# Patient Record
Sex: MALE | Race: WHITE | NOT HISPANIC OR LATINO | Employment: OTHER | URBAN - METROPOLITAN AREA
[De-identification: names, ages, dates, MRNs, and addresses within clinical notes are randomized per-mention and may not be internally consistent; named-entity substitution may affect disease eponyms.]

---

## 2019-03-29 ENCOUNTER — CLINICAL SUPPORT (OUTPATIENT)
Dept: INTERNAL MEDICINE | Facility: CLINIC | Age: 69
End: 2019-03-29

## 2019-03-29 DIAGNOSIS — I48.0 PAROXYSMAL ATRIAL FIBRILLATION (HCC): Primary | ICD-10-CM

## 2019-03-29 LAB — INR PPP: 2.5 (ref 0.9–1.1)

## 2019-03-29 PROCEDURE — 93793 ANTICOAG MGMT PT WARFARIN: CPT | Performed by: INTERNAL MEDICINE

## 2019-03-29 PROCEDURE — 85610 PROTHROMBIN TIME: CPT | Performed by: INTERNAL MEDICINE

## 2019-03-29 PROCEDURE — 36416 COLLJ CAPILLARY BLOOD SPEC: CPT | Performed by: INTERNAL MEDICINE

## 2019-04-19 ENCOUNTER — ANTICOAGULATION VISIT (OUTPATIENT)
Dept: INTERNAL MEDICINE | Facility: CLINIC | Age: 69
End: 2019-04-19

## 2019-04-19 DIAGNOSIS — I48.20 CHRONIC ATRIAL FIBRILLATION (HCC): Primary | ICD-10-CM

## 2019-04-19 PROBLEM — I48.91 ATRIAL FIBRILLATION (HCC): Status: ACTIVE | Noted: 2019-04-19

## 2019-04-19 LAB
INR PPP: 3.5 (ref 0.9–1.1)
INR PPP: 3.5 (ref 0.9–1.1)

## 2019-04-19 PROCEDURE — 36416 COLLJ CAPILLARY BLOOD SPEC: CPT | Performed by: INTERNAL MEDICINE

## 2019-04-19 PROCEDURE — 85610 PROTHROMBIN TIME: CPT | Performed by: INTERNAL MEDICINE

## 2019-04-19 PROCEDURE — 93793 ANTICOAG MGMT PT WARFARIN: CPT | Performed by: INTERNAL MEDICINE

## 2019-04-26 ENCOUNTER — CLINICAL SUPPORT (OUTPATIENT)
Dept: INTERNAL MEDICINE | Facility: CLINIC | Age: 69
End: 2019-04-26

## 2019-04-26 ENCOUNTER — LAB (OUTPATIENT)
Dept: INTERNAL MEDICINE | Facility: CLINIC | Age: 69
End: 2019-04-26

## 2019-04-26 DIAGNOSIS — Z00.00 HEALTHCARE MAINTENANCE: ICD-10-CM

## 2019-04-26 DIAGNOSIS — I48.20 CHRONIC ATRIAL FIBRILLATION (HCC): ICD-10-CM

## 2019-04-26 DIAGNOSIS — E78.2 MIXED HYPERLIPIDEMIA: Primary | ICD-10-CM

## 2019-04-26 DIAGNOSIS — I10 ESSENTIAL HYPERTENSION, BENIGN: ICD-10-CM

## 2019-04-26 LAB — INR PPP: 2.8 (ref 0.9–1.1)

## 2019-04-26 PROCEDURE — 36416 COLLJ CAPILLARY BLOOD SPEC: CPT | Performed by: INTERNAL MEDICINE

## 2019-04-26 PROCEDURE — 93793 ANTICOAG MGMT PT WARFARIN: CPT | Performed by: INTERNAL MEDICINE

## 2019-04-26 PROCEDURE — 85610 PROTHROMBIN TIME: CPT | Performed by: INTERNAL MEDICINE

## 2019-04-27 LAB
ALBUMIN SERPL-MCNC: 4.1 G/DL (ref 3.5–5.2)
ALBUMIN/GLOB SERPL: 1.4 G/DL
ALP SERPL-CCNC: 78 U/L (ref 39–117)
ALT SERPL-CCNC: 14 U/L (ref 1–41)
AST SERPL-CCNC: 14 U/L (ref 1–40)
BILIRUB SERPL-MCNC: 0.3 MG/DL (ref 0.2–1.2)
BUN SERPL-MCNC: 15 MG/DL (ref 8–23)
BUN/CREAT SERPL: 18.3 (ref 7–25)
CALCIUM SERPL-MCNC: 10 MG/DL (ref 8.6–10.5)
CHLORIDE SERPL-SCNC: 104 MMOL/L (ref 98–107)
CHOLEST SERPL-MCNC: 109 MG/DL (ref 0–200)
CO2 SERPL-SCNC: 26.3 MMOL/L (ref 22–29)
CREAT SERPL-MCNC: 0.82 MG/DL (ref 0.76–1.27)
GLOBULIN SER CALC-MCNC: 3 GM/DL
GLUCOSE SERPL-MCNC: 111 MG/DL (ref 65–99)
HDLC SERPL-MCNC: 33 MG/DL (ref 40–60)
LDLC SERPL CALC-MCNC: 52 MG/DL (ref 0–100)
POTASSIUM SERPL-SCNC: 4.6 MMOL/L (ref 3.5–5.2)
PROT SERPL-MCNC: 7.1 G/DL (ref 6–8.5)
SODIUM SERPL-SCNC: 141 MMOL/L (ref 136–145)
TRIGL SERPL-MCNC: 118 MG/DL (ref 0–150)
VLDLC SERPL CALC-MCNC: 23.6 MG/DL

## 2019-04-30 RX ORDER — ROSUVASTATIN CALCIUM 10 MG/1
10 TABLET, COATED ORAL DAILY
COMMUNITY
Start: 2019-01-21 | End: 2019-04-30 | Stop reason: SDUPTHER

## 2019-04-30 RX ORDER — ROSUVASTATIN CALCIUM 10 MG/1
10 TABLET, COATED ORAL DAILY
Qty: 90 TABLET | Refills: 0 | Status: SHIPPED | OUTPATIENT
Start: 2019-04-30 | End: 2019-05-13 | Stop reason: SDUPTHER

## 2019-05-13 RX ORDER — ROSUVASTATIN CALCIUM 10 MG/1
10 TABLET, COATED ORAL DAILY
Qty: 90 TABLET | Refills: 1 | Status: SHIPPED | OUTPATIENT
Start: 2019-05-13 | End: 2019-08-08

## 2019-05-22 ENCOUNTER — OFFICE VISIT (OUTPATIENT)
Dept: INTERNAL MEDICINE | Facility: CLINIC | Age: 69
End: 2019-05-22

## 2019-05-22 ENCOUNTER — HOSPITAL ENCOUNTER (OUTPATIENT)
Dept: GENERAL RADIOLOGY | Facility: HOSPITAL | Age: 69
Discharge: HOME | End: 2019-05-22
Admitting: PHYSICIAN ASSISTANT

## 2019-05-22 ENCOUNTER — TELEPHONE (OUTPATIENT)
Dept: INTERNAL MEDICINE | Facility: CLINIC | Age: 69
End: 2019-05-22

## 2019-05-22 VITALS
TEMPERATURE: 97.9 F | HEART RATE: 64 BPM | HEIGHT: 68 IN | OXYGEN SATURATION: 96 % | WEIGHT: 276.2 LBS | RESPIRATION RATE: 20 BRPM | DIASTOLIC BLOOD PRESSURE: 80 MMHG | SYSTOLIC BLOOD PRESSURE: 124 MMHG | BODY MASS INDEX: 41.86 KG/M2

## 2019-05-22 DIAGNOSIS — I48.20 CHRONIC ATRIAL FIBRILLATION (HCC): ICD-10-CM

## 2019-05-22 DIAGNOSIS — R06.02 SOB (SHORTNESS OF BREATH): ICD-10-CM

## 2019-05-22 DIAGNOSIS — R05.9 COUGH: Primary | ICD-10-CM

## 2019-05-22 DIAGNOSIS — R06.2 WHEEZING: ICD-10-CM

## 2019-05-22 DIAGNOSIS — R06.2 WHEEZING: Primary | ICD-10-CM

## 2019-05-22 DIAGNOSIS — J44.9 CHRONIC OBSTRUCTIVE PULMONARY DISEASE, UNSPECIFIED COPD TYPE (HCC): ICD-10-CM

## 2019-05-22 PROBLEM — G47.33 OSA (OBSTRUCTIVE SLEEP APNEA): Status: ACTIVE | Noted: 2019-05-22

## 2019-05-22 PROBLEM — I25.10 CORONARY ARTERY DISEASE: Status: ACTIVE | Noted: 2019-05-22

## 2019-05-22 LAB — INR PPP: 1.7 (ref 0.9–1.1)

## 2019-05-22 PROCEDURE — 94640 AIRWAY INHALATION TREATMENT: CPT | Performed by: PHYSICIAN ASSISTANT

## 2019-05-22 PROCEDURE — 85610 PROTHROMBIN TIME: CPT | Performed by: PHYSICIAN ASSISTANT

## 2019-05-22 PROCEDURE — 71046 X-RAY EXAM CHEST 2 VIEWS: CPT

## 2019-05-22 PROCEDURE — 36416 COLLJ CAPILLARY BLOOD SPEC: CPT | Performed by: PHYSICIAN ASSISTANT

## 2019-05-22 PROCEDURE — 99213 OFFICE O/P EST LOW 20 MIN: CPT | Performed by: PHYSICIAN ASSISTANT

## 2019-05-22 RX ORDER — ALBUTEROL SULFATE 2.5 MG/3ML
2.5 SOLUTION RESPIRATORY (INHALATION) EVERY 4 HOURS PRN
Qty: 60 VIAL | Refills: 0 | Status: SHIPPED | OUTPATIENT
Start: 2019-05-22 | End: 2019-08-08

## 2019-05-22 RX ORDER — ALBUTEROL SULFATE 2.5 MG/3ML
2.5 SOLUTION RESPIRATORY (INHALATION) EVERY 4 HOURS PRN
Qty: 30 VIAL | Refills: 1 | Status: SHIPPED | OUTPATIENT
Start: 2019-05-22 | End: 2019-05-22 | Stop reason: SDUPTHER

## 2019-05-22 RX ORDER — FUROSEMIDE 40 MG/1
40 TABLET ORAL DAILY
COMMUNITY
End: 2019-08-07 | Stop reason: SDUPTHER

## 2019-05-22 RX ORDER — WARFARIN SODIUM 5 MG/1
5 TABLET ORAL DAILY
COMMUNITY
Start: 2017-05-15 | End: 2019-10-08

## 2019-05-22 RX ORDER — HYDROCODONE BITARTRATE AND ACETAMINOPHEN 5; 325 MG/1; MG/1
5 TABLET ORAL DAILY
COMMUNITY
Start: 2017-07-24 | End: 2019-08-08

## 2019-05-22 RX ORDER — FLURBIPROFEN SODIUM 0.3 MG/ML
0.3 SOLUTION/ DROPS OPHTHALMIC AS NEEDED
COMMUNITY
Start: 2019-05-02

## 2019-05-22 RX ORDER — IPRATROPIUM BROMIDE AND ALBUTEROL SULFATE 2.5; .5 MG/3ML; MG/3ML
3 SOLUTION RESPIRATORY (INHALATION)
Status: DISCONTINUED | OUTPATIENT
Start: 2019-05-22 | End: 2023-02-13

## 2019-05-22 RX ORDER — ALBUTEROL SULFATE 2.5 MG/3ML
2.5 SOLUTION RESPIRATORY (INHALATION) EVERY 4 HOURS PRN
Qty: 60 VIAL | Refills: 0 | Status: SHIPPED | OUTPATIENT
Start: 2019-05-22 | End: 2020-03-21 | Stop reason: SDUPTHER

## 2019-05-22 RX ORDER — ALLOPURINOL 300 MG/1
300 TABLET ORAL DAILY
COMMUNITY
Start: 2019-03-02 | End: 2020-11-17

## 2019-05-22 RX ORDER — COLCHICINE 0.6 MG/1
0.6 TABLET ORAL DAILY
COMMUNITY
End: 2020-11-17

## 2019-05-22 RX ORDER — ESOMEPRAZOLE MAGNESIUM 40 MG/1
40 CAPSULE, DELAYED RELEASE ORAL DAILY
COMMUNITY
End: 2020-11-17

## 2019-05-22 RX ORDER — DOCUSATE SODIUM 100 MG/1
100 CAPSULE, LIQUID FILLED ORAL AS NEEDED
COMMUNITY
End: 2020-11-17

## 2019-05-22 RX ORDER — NITROGLYCERIN 0.4 MG/1
0.4 TABLET SUBLINGUAL AS NEEDED
COMMUNITY
Start: 2017-08-03 | End: 2020-11-17

## 2019-05-22 RX ORDER — POTASSIUM CHLORIDE 750 MG/1
10 CAPSULE, EXTENDED RELEASE ORAL DAILY
COMMUNITY
Start: 2017-05-15 | End: 2019-08-08

## 2019-05-22 NOTE — TELEPHONE ENCOUNTER
Patient is calling because she gave him a nebulizer and the insurance would not pay. He is asking if we could reword the order so they would pay. He said they should have faxed over a request for this earlier. Pharmacy [phone]. Thanks

## 2019-05-22 NOTE — PROGRESS NOTES
Subjective   Chief Complaint   Patient presents with   • Shortness of Breath     cough x1 week soa    • URI   • Wheezing   • Cough       She is a 69-year-old white male who presented today for an INR check and asked to be seen due to cough and wheezing for the last week.  He states he is also had shortness of breath with his cough and is unable to get any relief.  No head congestion no postnasal drainage no fever no chills.  Cough is productive however sputum is thick and white.  He denies chest pain, he did take 4 500 mg tablets of amoxicillin yesterday trying to alleviate his symptoms that he had for dental procedures.            Patient Active Problem List   Diagnosis   • Atrial fibrillation (CMS/[facility]) [I48.91]       No Known Allergies    Current Outpatient Medications on File Prior to Visit   Medication Sig Dispense Refill   • allopurinol (ZYLOPRIM) 300 MG tablet Take 300 mg by mouth Daily.     • colchicine 0.6 MG tablet Take 0.6 mg by mouth Daily.     • docusate sodium (COLACE) 100 MG capsule Take 100 mg by mouth As Needed.     • esomeprazole (nexIUM) 40 MG capsule Take 40 mg by mouth Daily.     • flurbiprofen (OCUFEN) 0.03 % ophthalmic solution Administer 0.3 drops to both eyes As Needed.     • furosemide (LASIX) 40 MG tablet Take 40 mg by mouth Daily.     • HYDROcodone-acetaminophen (NORCO) 5-325 MG per tablet Take 5 tablets by mouth Daily.     • nitroglycerin (NITROSTAT) 0.4 MG SL tablet Take 0.4 mg by mouth As Needed.     • potassium chloride (MICRO-K) 10 MEQ CR capsule Take 10 mEq by mouth Daily.     • rosuvastatin (CRESTOR) 10 MG tablet Take 1 tablet by mouth Daily. 90 tablet 1   • warfarin (COUMADIN) 5 MG tablet Take 5 mg by mouth Daily.     • fluticasone (VERAMYST) 27.5 MCG/SPRAY nasal spray 2 sprays into the nostril(s) as directed by provider Daily.       No current facility-administered medications on file prior to visit.        Past Medical History:   Diagnosis Date   • Allergic    • Anemia    •  Cancer (CMS/HCC)     PROSTATE CA   • Chest pain    • Constipation    • Coronary artery disease    • Diastasis recti    • Flushing    • Gout    • Hyperlipidemia    • Hypertension    • Myocardial infarction (CMS/HCC)    • Obesity    • ERICK (obstructive sleep apnea)    • PFO (patent foramen ovale)    • Pneumonia    • TIA (transient ischemic attack)    • Tubular adenoma of colon    • Varicose vein of leg        Family History   Problem Relation Age of Onset   • Alcohol abuse Mother    • Arthritis Mother    • Colon cancer Mother    • Rectal cancer Mother    • Alcohol abuse Father    • Arthritis Father    • Lung cancer Father        Social History     Socioeconomic History   • Marital status:      Spouse name: Not on file   • Number of children: Not on file   • Years of education: Not on file   • Highest education level: Not on file   Tobacco Use   • Smoking status: Former Smoker     Packs/day: 1.00     Years: 30.     Pack years: 30.00     Types: Cigarettes     Last attempt to quit: 1997     Years since quittin.4   • Smokeless tobacco: Never Used   Substance and Sexual Activity   • Alcohol use: Yes     Comment: rarely   • Drug use: No   • Sexual activity: Defer       Past Surgical History:   Procedure Laterality Date   • CARDIAC CATHETERIZATION     • CATARACT EXTRACTION     • COLONOSCOPY  2018   • CORONARY ARTERY BYPASS GRAFT     • HEMORRHOIDECTOMY     • HERNIA REPAIR     • REPLACEMENT TOTAL KNEE     • ROTATOR CUFF REPAIR     • SHOULDER SURGERY         PHQ-9 Depression Screening  Little interest or pleasure in doing things? 0   Feeling down, depressed, or hopeless? 0   Trouble falling or staying asleep, or sleeping too much?     Feeling tired or having little energy?     Poor appetite or overeating?     Feeling bad about yourself - or that you are a failure or have let yourself or your family down?     Trouble concentrating on things, such as reading the newspaper or watching television?     Moving or  "speaking so slowly that other people could have noticed? Or the opposite - being so fidgety or restless that you have been moving around a lot more than usual?     Thoughts that you would be better off dead, or of hurting yourself in some way?     PHQ-9 Total Score 0   If you checked off any problems, how difficult have these problems made it for you to do your work, take care of things at home, or get along with other people?       The following portions of the patient's history were reviewed and updated as appropriate: problem list, allergies, current medications and past medical history.    Review of Systems   Constitution: Negative for chills and fever.   HENT: Negative for congestion.    Cardiovascular: Negative for chest pain.   Respiratory: Positive for cough, shortness of breath, sputum production and wheezing.        Immunization History   Administered Date(s) Administered   • Flu Vaccine High Dose PF 65YR+ 10/08/2018   • Pneumococcal Conjugate 13-Valent (PCV13) 10/03/2016   • Pneumococcal Polysaccharide (PPSV23) 10/28/2014   • Tdap 10/14/2015       Objective   Vitals:    05/22/19 0755 05/22/19 0827   BP:  124/80   Pulse: 64    Resp: 20    Temp: 97.9 °F (36.6 °C)    TempSrc: Oral    SpO2: 96%    Weight: 125 kg (276 lb 3.2 oz)    Height: 172.7 cm (68\")      Physical Exam   Cardiovascular: Normal rate and regular rhythm.   Pulmonary/Chest: Effort normal. He has wheezes in the right upper field, the right middle field, the right lower field, the left upper field, the left middle field and the left lower field.       Assessment/Plan     Albuterol neb today in office, pt tolerated well and had significant improvement in his wheezing, however still present on auscultation with expiration in all fields, worse in left base. Stat cxr showed no pneumonia. Will treat with Symbicort low dose 2 puffs twice per day and albuterol neb sent home with pt prn. He will follow up next week in office.     inr is 1.7 today " decreased his dose from 5 mg daily except Monday and fridays for which he was taking 7.5 mg on these days. Decreased to 5 mg daily after an INR of 3.5, his inr then stabilized at 2.8 now changed today. I will have him take 7.5 mg on Fridays and 5 mg every other day and then recheck in 2 weeks.     Return in about 6 days (around 5/28/2019).

## 2019-05-22 NOTE — PROGRESS NOTES
Subjective        Chief Complaint   Patient presents with   • Shortness of Breath     cough x1 week soa    • URI   • Wheezing   • Cough     PHQ-2 Depression Screening  Little interest or pleasure in doing things? 0   Feeling down, depressed, or hopeless? 0   PHQ-2 Total Score 0           Silvino Hidalgo Jr. is a 69 y.o. male who presents for    Patient Active Problem List   Diagnosis   • Atrial fibrillation (CMS/HCC) [I48.91]       History of Present Illness       No Known Allergies    Current Outpatient Medications on File Prior to Visit   Medication Sig Dispense Refill   • allopurinol (ZYLOPRIM) 300 MG tablet Take 300 mg by mouth Daily.     • colchicine 0.6 MG tablet Take 0.6 mg by mouth Daily.     • docusate sodium (COLACE) 100 MG capsule Take 100 mg by mouth As Needed.     • esomeprazole (nexIUM) 40 MG capsule Take 40 mg by mouth Daily.     • flurbiprofen (OCUFEN) 0.03 % ophthalmic solution Administer 0.3 drops to both eyes As Needed.     • furosemide (LASIX) 40 MG tablet Take 40 mg by mouth Daily.     • HYDROcodone-acetaminophen (NORCO) 5-325 MG per tablet Take 5 tablets by mouth Daily.     • nitroglycerin (NITROSTAT) 0.4 MG SL tablet Take 0.4 mg by mouth As Needed.     • potassium chloride (MICRO-K) 10 MEQ CR capsule Take 10 mEq by mouth Daily.     • rosuvastatin (CRESTOR) 10 MG tablet Take 1 tablet by mouth Daily. 90 tablet 1   • warfarin (COUMADIN) 5 MG tablet Take 5 mg by mouth Daily.     • fluticasone (VERAMYST) 27.5 MCG/SPRAY nasal spray 2 sprays into the nostril(s) as directed by provider Daily.       No current facility-administered medications on file prior to visit.        Past Medical History:   Diagnosis Date   • Allergic    • Anemia    • Cancer (CMS/HCC)     PROSTATE CA   • Chest pain    • Constipation    • Coronary artery disease    • Diastasis recti    • Flushing    • Gout    • Hyperlipidemia    • Hypertension    • Myocardial infarction (CMS/HCC)    • Obesity    • ERICK (obstructive sleep apnea)    •  "PFO (patent foramen ovale)    • Pneumonia    • TIA (transient ischemic attack)    • Tubular adenoma of colon    • Varicose vein of leg        Past Surgical History:   Procedure Laterality Date   • CARDIAC CATHETERIZATION     • CATARACT EXTRACTION     • COLONOSCOPY     • CORONARY ARTERY BYPASS GRAFT     • HEMORRHOIDECTOMY     • HERNIA REPAIR     • REPLACEMENT TOTAL KNEE     • ROTATOR CUFF REPAIR     • SHOULDER SURGERY         Family History   Problem Relation Age of Onset   • Alcohol abuse Mother    • Arthritis Mother    • Colon cancer Mother    • Rectal cancer Mother    • Alcohol abuse Father    • Arthritis Father    • Lung cancer Father        Social History     Socioeconomic History   • Marital status:      Spouse name: Not on file   • Number of children: Not on file   • Years of education: Not on file   • Highest education level: Not on file   Tobacco Use   • Smoking status: Former Smoker     Packs/day: 1.00     Years: 30.00     Pack years: 30.00     Types: Cigarettes     Last attempt to quit: 1997     Years since quittin.4   • Smokeless tobacco: Never Used   Substance and Sexual Activity   • Alcohol use: Yes     Comment: rarely   • Drug use: No   • Sexual activity: Defer           The following portions of the patient's history were reviewed and updated as appropriate: {history reviewed:19710::\"problem list\",\"allergies\",\"current medications\",\"past medical history\",\"past family history\",\"past social history\",\"past surgical history\"}.    Review of Systems    Immunization History   Administered Date(s) Administered   • Flu Vaccine High Dose PF 65YR+ 10/08/2018   • Pneumococcal Conjugate 13-Valent (PCV13) 10/03/2016   • Pneumococcal Polysaccharide (PPSV23) 10/28/2014   • Tdap 10/14/2015       Objective   Vitals:    19 0755 19 0827   BP:  124/80   Pulse: 64    Resp: 20    Temp: 97.9 °F (36.6 °C)    TempSrc: Oral    SpO2: 96%    Weight: 125 kg (276 lb 3.2 oz)    Height: 172.7 cm (68\")  "     Physical Exam    Procedures    Assessment/Plan   {Assess/PlanSmartLinks:64231}    inr is 1.7 today decreased his dose from 5 mg daily except Monday and fridays for which he was taking 7.5 mg on these days. Decreased to 5 mg daily after an INR of 3.5, his inr then stabilized at 2.8 now changed today. I will have him take 7.5 mg on Fridays and 5 mg every other day and then recheck in 2 weeks.          Return in about 6 days (around 5/28/2019).

## 2019-05-22 NOTE — NURSING NOTE
Report called to MD office for Payton RICHARDS and they wanted him to come back to the office for instructions on his nebulizer.  He left via ambulation to the office.

## 2019-05-22 NOTE — TELEPHONE ENCOUNTER
Pt in office informed of changes with Warfarin and fup PT/Inr 2 weeks also nebulizer solution called into [place] pt verbalizes understanding poncho

## 2019-05-28 ENCOUNTER — OFFICE VISIT (OUTPATIENT)
Dept: INTERNAL MEDICINE | Facility: CLINIC | Age: 69
End: 2019-05-28

## 2019-05-28 VITALS
HEIGHT: 68 IN | HEART RATE: 74 BPM | RESPIRATION RATE: 16 BRPM | WEIGHT: 274 LBS | OXYGEN SATURATION: 97 % | BODY MASS INDEX: 41.52 KG/M2

## 2019-05-28 DIAGNOSIS — J40 BRONCHITIS: Primary | ICD-10-CM

## 2019-05-28 DIAGNOSIS — I51.7 MILD CARDIOMEGALY: ICD-10-CM

## 2019-05-28 DIAGNOSIS — Z79.01 ANTICOAGULATED: ICD-10-CM

## 2019-05-28 PROCEDURE — 99213 OFFICE O/P EST LOW 20 MIN: CPT | Performed by: PHYSICIAN ASSISTANT

## 2019-05-28 NOTE — PROGRESS NOTES
Subjective   Chief Complaint   Patient presents with   • Cough     6 day follow up, pt states he is much better       Pt is a 69 year old white male who presents today for 6 day follow up on bronchitis. He had cxr a week ago that showed borderline cardiomegaly, has not been told this before. Sees. Dr. Coelho cardiology, unsure when his last echo was. Pt states he is markedly improved and is breathing better than he has in months. Wheezing has resolved, still has occasional cough but rare and dry. Overall much better.           Patient Active Problem List   Diagnosis   • Atrial fibrillation (CMS/[facility]) [I48.91]   • Coronary artery disease   • ERICK (obstructive sleep apnea)   • Mild cardiomegaly       No Known Allergies    Current Outpatient Medications on File Prior to Visit   Medication Sig Dispense Refill   • albuterol (PROVENTIL) (2.5 MG/3ML) 0.083% nebulizer solution Take 2.5 mg by nebulization Every 4 (Four) Hours As Needed for Wheezing. 60 vial 0   • albuterol (PROVENTIL) (2.5 MG/3ML) 0.083% nebulizer solution Take 2.5 mg by nebulization Every 4 (Four) Hours As Needed for Wheezing. 60 vial 0   • allopurinol (ZYLOPRIM) 300 MG tablet Take 300 mg by mouth Daily.     • colchicine 0.6 MG tablet Take 0.6 mg by mouth Daily.     • docusate sodium (COLACE) 100 MG capsule Take 100 mg by mouth As Needed.     • esomeprazole (nexIUM) 40 MG capsule Take 40 mg by mouth Daily.     • flurbiprofen (OCUFEN) 0.03 % ophthalmic solution Administer 0.3 drops to both eyes As Needed.     • fluticasone (VERAMYST) 27.5 MCG/SPRAY nasal spray 2 sprays into the nostril(s) as directed by provider Daily.     • furosemide (LASIX) 40 MG tablet Take 40 mg by mouth Daily.     • HYDROcodone-acetaminophen (NORCO) 5-325 MG per tablet Take 5 tablets by mouth Daily.     • nitroglycerin (NITROSTAT) 0.4 MG SL tablet Take 0.4 mg by mouth As Needed.     • potassium chloride (MICRO-K) 10 MEQ CR capsule Take 10 mEq by mouth Daily.     • rosuvastatin (CRESTOR) 10  MG tablet Take 1 tablet by mouth Daily. 90 tablet 1   • warfarin (COUMADIN) 5 MG tablet Take 5 mg by mouth Daily.       Current Facility-Administered Medications on File Prior to Visit   Medication Dose Route Frequency Provider Last Rate Last Dose   • ipratropium-albuterol (DUO-NEB) nebulizer solution 3 mL  3 mL Nebulization 4x Daily - RT Mary Avendaño PA-C       • ipratropium-albuterol (DUO-NEB) nebulizer solution 3 mL  3 mL Nebulization 4x Daily - RT Mary Avendaño PA-C           Past Medical History:   Diagnosis Date   • Allergic    • Anemia    • Cancer (CMS/HCC)     PROSTATE CA   • Chest pain    • Constipation    • Coronary artery disease    • Diastasis recti    • Flushing    • Gout    • Hyperlipidemia    • Hypertension    • Myocardial infarction (CMS/HCC)    • Obesity    • ERICK (obstructive sleep apnea)    • PFO (patent foramen ovale)    • Pneumonia    • TIA (transient ischemic attack)    • Tubular adenoma of colon    • Varicose vein of leg        Family History   Problem Relation Age of Onset   • Alcohol abuse Mother    • Arthritis Mother    • Colon cancer Mother    • Rectal cancer Mother    • Alcohol abuse Father    • Arthritis Father    • Lung cancer Father        Social History     Socioeconomic History   • Marital status:      Spouse name: Not on file   • Number of children: Not on file   • Years of education: Not on file   • Highest education level: Not on file   Tobacco Use   • Smoking status: Former Smoker     Packs/day: 1.00     Years: 30.00     Pack years: 30.00     Types: Cigarettes     Last attempt to quit: 1997     Years since quittin.4   • Smokeless tobacco: Never Used   Substance and Sexual Activity   • Alcohol use: Yes     Comment: rarely   • Drug use: No   • Sexual activity: Defer       Past Surgical History:   Procedure Laterality Date   • CARDIAC CATHETERIZATION     • CATARACT EXTRACTION     • COLONOSCOPY  2018   • CORONARY ARTERY BYPASS GRAFT     • HEMORRHOIDECTOMY     •  "HERNIA REPAIR     • REPLACEMENT TOTAL KNEE     • ROTATOR CUFF REPAIR     • SHOULDER SURGERY         PHQ-9 Depression Screening  Little interest or pleasure in doing things?     Feeling down, depressed, or hopeless?     Trouble falling or staying asleep, or sleeping too much?     Feeling tired or having little energy?     Poor appetite or overeating?     Feeling bad about yourself - or that you are a failure or have let yourself or your family down?     Trouble concentrating on things, such as reading the newspaper or watching television?     Moving or speaking so slowly that other people could have noticed? Or the opposite - being so fidgety or restless that you have been moving around a lot more than usual?     Thoughts that you would be better off dead, or of hurting yourself in some way?     PHQ-9 Total Score     If you checked off any problems, how difficult have these problems made it for you to do your work, take care of things at home, or get along with other people?       The following portions of the patient's history were reviewed and updated as appropriate: problem list, allergies, current medications, past medical history, past family history, past social history and past surgical history.    Review of Systems   Respiratory: Positive for cough. Negative for shortness of breath and wheezing.        Immunization History   Administered Date(s) Administered   • Flu Vaccine High Dose PF 65YR+ 10/08/2018   • Pneumococcal Conjugate 13-Valent (PCV13) 10/03/2016   • Pneumococcal Polysaccharide (PPSV23) 10/28/2014   • Tdap 10/14/2015       Objective   Vitals:    05/28/19 0933   Pulse: 74   Resp: 16   SpO2: 97%   Weight: 124 kg (274 lb)   Height: 172.7 cm (68\")     Physical Exam   Cardiovascular: Normal rate, regular rhythm and normal heart sounds.   Pulmonary/Chest: Effort normal and breath sounds normal.   Vitals reviewed.        Assessment/Plan   Silvino was seen today for cough.    Diagnoses and all orders for " this visit:    Bronchitis  Comments:  Resolved    Mild cardiomegaly  Comments:  Borderline per 5/22/19 CXR. Will obtain cardiac records from Dr. Coelho to determine if new.     Anticoagulated  Comments:  Adjusted coumadin dosing on 5/22/19, pt will return for INR on 6/5/19.

## 2019-06-05 ENCOUNTER — ANTICOAGULATION VISIT (OUTPATIENT)
Dept: INTERNAL MEDICINE | Facility: CLINIC | Age: 69
End: 2019-06-05

## 2019-06-05 DIAGNOSIS — I48.20 CHRONIC ATRIAL FIBRILLATION (HCC): ICD-10-CM

## 2019-06-05 LAB — INR PPP: 2.1 (ref 0.9–1.1)

## 2019-06-05 PROCEDURE — 85610 PROTHROMBIN TIME: CPT | Performed by: INTERNAL MEDICINE

## 2019-08-07 ENCOUNTER — TELEPHONE (OUTPATIENT)
Dept: INTERNAL MEDICINE | Facility: CLINIC | Age: 69
End: 2019-08-07

## 2019-08-07 RX ORDER — POTASSIUM CHLORIDE 750 MG/1
TABLET, FILM COATED, EXTENDED RELEASE ORAL
Qty: 180 TABLET | Refills: 2 | Status: SHIPPED | OUTPATIENT
Start: 2019-08-07 | End: 2020-11-17

## 2019-08-07 RX ORDER — FUROSEMIDE 40 MG/1
TABLET ORAL
Qty: 180 TABLET | Refills: 2 | Status: SHIPPED | OUTPATIENT
Start: 2019-08-07 | End: 2021-02-26

## 2019-08-08 ENCOUNTER — OFFICE VISIT (OUTPATIENT)
Dept: INTERNAL MEDICINE | Facility: CLINIC | Age: 69
End: 2019-08-08

## 2019-08-08 VITALS
HEIGHT: 68 IN | SYSTOLIC BLOOD PRESSURE: 128 MMHG | WEIGHT: 273.6 LBS | BODY MASS INDEX: 41.46 KG/M2 | DIASTOLIC BLOOD PRESSURE: 82 MMHG

## 2019-08-08 DIAGNOSIS — Z11.59 NEED FOR HEPATITIS C SCREENING TEST: ICD-10-CM

## 2019-08-08 DIAGNOSIS — E78.5 HYPERLIPIDEMIA, UNSPECIFIED HYPERLIPIDEMIA TYPE: ICD-10-CM

## 2019-08-08 DIAGNOSIS — R06.02 SHORTNESS OF BREATH: ICD-10-CM

## 2019-08-08 DIAGNOSIS — I25.10 CORONARY ARTERY DISEASE INVOLVING NATIVE CORONARY ARTERY OF NATIVE HEART WITHOUT ANGINA PECTORIS: Primary | ICD-10-CM

## 2019-08-08 DIAGNOSIS — C61 PROSTATE CANCER (HCC): ICD-10-CM

## 2019-08-08 DIAGNOSIS — G47.33 OSA (OBSTRUCTIVE SLEEP APNEA): ICD-10-CM

## 2019-08-08 DIAGNOSIS — I10 ESSENTIAL HYPERTENSION: ICD-10-CM

## 2019-08-08 PROBLEM — S62.101A RIGHT WRIST FRACTURE: Status: RESOLVED | Noted: 2017-05-24 | Resolved: 2019-08-08

## 2019-08-08 PROBLEM — S62.101A RIGHT WRIST FRACTURE: Status: ACTIVE | Noted: 2017-05-24

## 2019-08-08 PROBLEM — I25.5 ISCHEMIC CARDIOMYOPATHY: Status: ACTIVE | Noted: 2019-07-22

## 2019-08-08 PROBLEM — I45.3 TRIFASCICULAR BUNDLE BRANCH BLOCK: Status: ACTIVE | Noted: 2019-07-22

## 2019-08-08 PROBLEM — I50.30 CONGESTIVE HEART FAILURE WITH LV DIASTOLIC DYSFUNCTION, NYHA CLASS 2: Status: ACTIVE | Noted: 2019-07-22

## 2019-08-08 PROBLEM — I47.20 VT (VENTRICULAR TACHYCARDIA): Status: ACTIVE | Noted: 2019-07-22

## 2019-08-08 PROCEDURE — 99214 OFFICE O/P EST MOD 30 MIN: CPT | Performed by: INTERNAL MEDICINE

## 2019-08-08 RX ORDER — CARVEDILOL 25 MG/1
25 TABLET ORAL 2 TIMES DAILY
COMMUNITY
Start: 2019-07-23 | End: 2020-07-22

## 2019-08-08 RX ORDER — ASPIRIN 81 MG/1
81 TABLET, CHEWABLE ORAL DAILY
COMMUNITY
End: 2020-11-17

## 2019-08-08 RX ORDER — ATORVASTATIN CALCIUM 10 MG/1
10 TABLET, FILM COATED ORAL DAILY
COMMUNITY
Start: 2019-07-10 | End: 2020-11-25

## 2019-08-08 RX ORDER — ISOSORBIDE MONONITRATE 30 MG/1
30 TABLET, EXTENDED RELEASE ORAL DAILY
COMMUNITY
End: 2020-07-17

## 2019-08-08 NOTE — PROGRESS NOTES
Subjective        Chief Complaint   Patient presents with   • Follow-up     s/p AICD pacemaker   • Coronary Artery Disease   • Shortness of Breath       Fall Risk Assessment was completed, and patient is at low risk for falls.      Silvino Hidalgo Jr. is a 69 y.o. male who presents for    Patient Active Problem List   Diagnosis   • Atrial fibrillation (CMS/[facility]) [I48.91]   • ERICK (obstructive sleep apnea)   • Congestive heart failure with LV diastolic dysfunction, NYHA class 2 (CMS/[facility])   • Edema   • Fatigue   • H/O total shoulder replacement   • Hyperlipidemia   • Ischemic cardiomyopathy   • Obesity   • PFO (patent foramen ovale)   • RBBB (right bundle branch block)   • Rotator cuff arthropathy   • S/P CABG (coronary artery bypass graft)   • Shortness of breath   • Stroke (CMS/[facility])   • Trifascicular bundle branch block   • Venous (peripheral) insufficiency   • VT (ventricular tachycardia) (CMS/[facility])   • CAD (coronary artery disease)   • Hypertension   • Prostate cancer (CMS/[facility])       History of Present Illness     He had chest pain over a month ago. He went to see his cardiologist (Dr. Coelho) who referred to Dr. Cardoso. His EF was 35 percent; it dropped from 40 percent. He had a heart cath on July 9th and he had an AICD placed later in the month for VT and ischemic cardiomyopathy. His bypasses were open except one was 45 percent blocked. He has felt fine since except he is short of breath since having AICD adjusted last week.He was taken off Allopurinol with his cards. He was taken off Coumadin. He denies chest pain. He denies PND or orthopnea. He uses his biPap nightly. He gets up 3-4 times per night to urinate. It is not new. His prostate cancer was treated with radiation.  No Known Allergies    Current Outpatient Medications on File Prior to Visit   Medication Sig Dispense Refill   • albuterol (PROVENTIL) (2.5 MG/3ML) 0.083% nebulizer solution Take 2.5 mg by nebulization Every 4 (Four) Hours As Needed for Wheezing. 60  vial 0   • aspirin 81 MG chewable tablet Chew 81 mg Daily.     • atorvastatin (LIPITOR) 10 MG tablet Take 10 mg by mouth Daily.     • carvedilol (COREG) 25 MG tablet Take 25 mg by mouth 2 (Two) Times a Day.     • colchicine 0.6 MG tablet Take 0.6 mg by mouth Daily.     • docusate sodium (COLACE) 100 MG capsule Take 100 mg by mouth As Needed.     • esomeprazole (nexIUM) 40 MG capsule Take 40 mg by mouth Daily.     • flurbiprofen (OCUFEN) 0.03 % ophthalmic solution Administer 0.3 drops to both eyes As Needed.     • furosemide (LASIX) 40 MG tablet TAKE ONE TABLET BY MOUTH TWICE A  tablet 2   • isosorbide mononitrate (IMDUR) 30 MG 24 hr tablet Take 30 mg by mouth Daily.     • nitroglycerin (NITROSTAT) 0.4 MG SL tablet Take 0.4 mg by mouth As Needed.     • potassium chloride (K-DUR) 10 MEQ CR tablet TAKE ONE TABLET BY MOUTH TWICE A  tablet 2   • [DISCONTINUED] HYDROcodone-acetaminophen (NORCO) 5-325 MG per tablet Take 5 tablets by mouth Daily.     • allopurinol (ZYLOPRIM) 300 MG tablet Take 300 mg by mouth Daily.     • fluticasone (VERAMYST) 27.5 MCG/SPRAY nasal spray 2 sprays into the nostril(s) as directed by provider Daily.     • warfarin (COUMADIN) 5 MG tablet Take 5 mg by mouth Daily.     • [DISCONTINUED] albuterol (PROVENTIL) (2.5 MG/3ML) 0.083% nebulizer solution Take 2.5 mg by nebulization Every 4 (Four) Hours As Needed for Wheezing. 60 vial 0   • [DISCONTINUED] potassium chloride (MICRO-K) 10 MEQ CR capsule Take 10 mEq by mouth Daily.     • [DISCONTINUED] rosuvastatin (CRESTOR) 10 MG tablet Take 1 tablet by mouth Daily. 90 tablet 1     Current Facility-Administered Medications on File Prior to Visit   Medication Dose Route Frequency Provider Last Rate Last Dose   • ipratropium-albuterol (DUO-NEB) nebulizer solution 3 mL  3 mL Nebulization 4x Daily - RT Mary Avendaño P, PA-C       • ipratropium-albuterol (DUO-NEB) nebulizer solution 3 mL  3 mL Nebulization 4x Daily - RT Avendaño, Mary P, PA-C            Past Medical History:   Diagnosis Date   • Allergic    • Constipation    • Diastasis recti    • Flushing    • Gout    • Hyperlipidemia    • Hypertension    • Myocardial infarction (CMS/HCC)    • Obesity    • ERICK (obstructive sleep apnea)    • PFO (patent foramen ovale)    • Pneumonia    • Prostate cancer (CMS/HCC)     s/p XRT   • TIA (transient ischemic attack)    • Tubular adenoma of colon    • Varicose vein of leg        Past Surgical History:   Procedure Laterality Date   • CARDIAC CATHETERIZATION     • CARDIAC DEFIBRILLATOR PLACEMENT  2019   • CATARACT EXTRACTION     • COLONOSCOPY     • CORONARY ARTERY BYPASS GRAFT     • HEMORRHOIDECTOMY     • HERNIA REPAIR     • PACEMAKER IMPLANTATION     • REPLACEMENT TOTAL KNEE Bilateral    • ROTATOR CUFF REPAIR Right    • SHOULDER SURGERY Left     reversal       Family History   Problem Relation Age of Onset   • Alcohol abuse Mother    • Arthritis Mother    • Colon cancer Mother    • Rectal cancer Mother    • Alcohol abuse Father    • Arthritis Father    • Lung cancer Father        Social History     Socioeconomic History   • Marital status:      Spouse name: Not on file   • Number of children: Not on file   • Years of education: Not on file   • Highest education level: Not on file   Tobacco Use   • Smoking status: Former Smoker     Packs/day: 1.00     Years: 30.00     Pack years: 30.00     Types: Cigarettes     Last attempt to quit: 1997     Years since quittin.6   • Smokeless tobacco: Never Used   Substance and Sexual Activity   • Alcohol use: No     Frequency: Never     Comment: rarely   • Drug use: No   • Sexual activity: Defer           The following portions of the patient's history were reviewed and updated as appropriate: problem list, allergies, current medications, past medical history, past family history, past social history and past surgical history.    Review of Systems   Respiratory: Positive for shortness of breath.   "  Cardiovascular: Negative for chest pain.       Immunization History   Administered Date(s) Administered   • Flu Vaccine High Dose PF 65YR+ 10/08/2018   • Hepatitis A 10/01/2018, 05/01/2019   • Pneumococcal Conjugate 13-Valent (PCV13) 10/03/2016   • Pneumococcal Polysaccharide (PPSV23) 10/28/2014   • Tdap 10/14/2015       Objective   Vitals:    08/08/19 1407   BP: 128/82   Weight: 124 kg (273 lb 9.6 oz)   Height: 172.7 cm (68\")     Physical Exam   Constitutional: He appears well-developed and well-nourished.   HENT:   Head: Normocephalic and atraumatic.   Cardiovascular: Normal rate, regular rhythm, S1 normal, S2 normal and normal heart sounds.   Trace edema   Pulmonary/Chest: Effort normal and breath sounds normal.   Neurological: He is alert.   Skin: Skin is warm.   Psychiatric: He has a normal mood and affect.   Vitals reviewed.      Procedures    Assessment/Plan   Silvino was seen today for follow-up, coronary artery disease and shortness of breath.    Diagnoses and all orders for this visit:    Coronary artery disease involving native coronary artery of native heart without angina pectoris    Hyperlipidemia, unspecified hyperlipidemia type  -     Lipid Panel With / Chol / HDL Ratio; Future    Essential hypertension  -     Comprehensive Metabolic Panel; Future    ERICK (obstructive sleep apnea)    Shortness of breath    Prostate cancer (CMS/HCC)    Need for hepatitis C screening test  -     HCV Antibody Rfx To Qnt PCR; Future      Hospital records reviewed. His BP is good. He is SOA and he follows back up with his cardiologist next week. He uses his bipap. Check lipid next time on Lipitor. He was taken off of Coumadin with cards; get last note from Dr. Coelho. He will call his urologist as well for his nocturia.            Return in about 2 months (around 10/8/2019).  "

## 2019-08-19 ENCOUNTER — TELEPHONE (OUTPATIENT)
Dept: INTERNAL MEDICINE | Facility: CLINIC | Age: 69
End: 2019-08-19

## 2019-09-09 ENCOUNTER — TELEPHONE (OUTPATIENT)
Dept: INTERNAL MEDICINE | Facility: CLINIC | Age: 69
End: 2019-09-09

## 2019-09-09 NOTE — TELEPHONE ENCOUNTER
Brian with [facility] called they faxed over an order for supplies did we get it? [phone]

## 2019-10-02 LAB
ALBUMIN SERPL-MCNC: 3.7 G/DL (ref 3.5–5.2)
ALBUMIN/GLOB SERPL: 1.3 G/DL
ALP SERPL-CCNC: 88 U/L (ref 39–117)
ALT SERPL-CCNC: 10 U/L (ref 1–41)
AST SERPL-CCNC: 10 U/L (ref 1–40)
BILIRUB SERPL-MCNC: 0.4 MG/DL (ref 0.2–1.2)
BUN SERPL-MCNC: 10 MG/DL (ref 8–23)
BUN/CREAT SERPL: 11.2 (ref 7–25)
CALCIUM SERPL-MCNC: 8.8 MG/DL (ref 8.6–10.5)
CHLORIDE SERPL-SCNC: 106 MMOL/L (ref 98–107)
CHOLEST SERPL-MCNC: 113 MG/DL (ref 0–200)
CHOLEST/HDLC SERPL: 3.32 {RATIO}
CO2 SERPL-SCNC: 25.3 MMOL/L (ref 22–29)
CREAT SERPL-MCNC: 0.89 MG/DL (ref 0.76–1.27)
GLOBULIN SER CALC-MCNC: 2.9 GM/DL
GLUCOSE SERPL-MCNC: 98 MG/DL (ref 65–99)
HCV AB S/CO SERPL IA: <0.1 S/CO RATIO (ref 0–0.9)
HCV AB SERPL QL IA: NORMAL
HDLC SERPL-MCNC: 34 MG/DL (ref 40–60)
LDLC SERPL CALC-MCNC: 59 MG/DL (ref 0–100)
POTASSIUM SERPL-SCNC: 4.5 MMOL/L (ref 3.5–5.2)
PROT SERPL-MCNC: 6.6 G/DL (ref 6–8.5)
SODIUM SERPL-SCNC: 144 MMOL/L (ref 136–145)
TRIGL SERPL-MCNC: 98 MG/DL (ref 0–150)
VLDLC SERPL CALC-MCNC: 19.6 MG/DL

## 2019-10-03 ENCOUNTER — RESULTS ENCOUNTER (OUTPATIENT)
Dept: INTERNAL MEDICINE | Facility: CLINIC | Age: 69
End: 2019-10-03

## 2019-10-03 DIAGNOSIS — I10 ESSENTIAL HYPERTENSION: ICD-10-CM

## 2019-10-03 DIAGNOSIS — E78.5 HYPERLIPIDEMIA, UNSPECIFIED HYPERLIPIDEMIA TYPE: ICD-10-CM

## 2019-10-03 DIAGNOSIS — Z11.59 NEED FOR HEPATITIS C SCREENING TEST: ICD-10-CM

## 2019-10-08 ENCOUNTER — OFFICE VISIT (OUTPATIENT)
Dept: INTERNAL MEDICINE | Facility: CLINIC | Age: 69
End: 2019-10-08

## 2019-10-08 VITALS
WEIGHT: 277.4 LBS | BODY MASS INDEX: 42.04 KG/M2 | DIASTOLIC BLOOD PRESSURE: 82 MMHG | SYSTOLIC BLOOD PRESSURE: 126 MMHG | HEIGHT: 68 IN

## 2019-10-08 DIAGNOSIS — E66.01 MORBIDLY OBESE (HCC): ICD-10-CM

## 2019-10-08 DIAGNOSIS — E78.5 HYPERLIPIDEMIA, UNSPECIFIED HYPERLIPIDEMIA TYPE: ICD-10-CM

## 2019-10-08 DIAGNOSIS — I50.30 CONGESTIVE HEART FAILURE WITH LV DIASTOLIC DYSFUNCTION, NYHA CLASS 2 (HCC): ICD-10-CM

## 2019-10-08 DIAGNOSIS — I10 ESSENTIAL HYPERTENSION: Primary | ICD-10-CM

## 2019-10-08 PROCEDURE — G0008 ADMIN INFLUENZA VIRUS VAC: HCPCS | Performed by: INTERNAL MEDICINE

## 2019-10-08 PROCEDURE — 90653 IIV ADJUVANT VACCINE IM: CPT | Performed by: INTERNAL MEDICINE

## 2019-10-08 PROCEDURE — 99213 OFFICE O/P EST LOW 20 MIN: CPT | Performed by: INTERNAL MEDICINE

## 2019-10-08 NOTE — PROGRESS NOTES
Subjective        Chief Complaint   Patient presents with   • Follow-up     fup lab review med eval cad    • Hyperlipidemia   • Med Refill           Silvino Hidalgo Jr. is a 69 y.o. male who presents for    Patient Active Problem List   Diagnosis   • Atrial fibrillation (CMS/[facility]) [I48.91]   • ERICK (obstructive sleep apnea)   • Edema   • H/O total shoulder replacement   • Hyperlipidemia   • Ischemic cardiomyopathy   • Morbidly obese (CMS/[facility])   • PFO (patent foramen ovale)   • RBBB (right bundle branch block)   • Rotator cuff arthropathy   • S/P CABG (coronary artery bypass graft)   • Stroke (CMS/[facility])   • Trifascicular bundle branch block   • VT (ventricular tachycardia) (CMS/[facility])   • CAD (coronary artery disease)   • Hypertension   • Prostate cancer (CMS/[facility])       History of Present Illness     His SBP was 125 with the urologist. He denies edema, orthopnea, PND, chest pain or dyspnea. He says his EF is about 40 percent. He adds salt to his food sometimes.  No Known Allergies    Current Outpatient Medications on File Prior to Visit   Medication Sig Dispense Refill   • albuterol (PROVENTIL) (2.5 MG/3ML) 0.083% nebulizer solution Take 2.5 mg by nebulization Every 4 (Four) Hours As Needed for Wheezing. 60 vial 0   • allopurinol (ZYLOPRIM) 300 MG tablet Take 300 mg by mouth Daily.     • aspirin 81 MG chewable tablet Chew 81 mg Daily.     • atorvastatin (LIPITOR) 10 MG tablet Take 10 mg by mouth Daily.     • carvedilol (COREG) 25 MG tablet Take 25 mg by mouth 2 (Two) Times a Day.     • docusate sodium (COLACE) 100 MG capsule Take 100 mg by mouth As Needed.     • esomeprazole (nexIUM) 40 MG capsule Take 40 mg by mouth Daily.     • flurbiprofen (OCUFEN) 0.03 % ophthalmic solution Administer 0.3 drops to both eyes As Needed.     • fluticasone (VERAMYST) 27.5 MCG/SPRAY nasal spray 2 sprays into the nostril(s) as directed by provider Daily.     • furosemide (LASIX) 40 MG tablet TAKE ONE TABLET BY MOUTH TWICE A  tablet 2    • isosorbide mononitrate (IMDUR) 30 MG 24 hr tablet Take 30 mg by mouth Daily.     • nitroglycerin (NITROSTAT) 0.4 MG SL tablet Take 0.4 mg by mouth As Needed.     • potassium chloride (K-DUR) 10 MEQ CR tablet TAKE ONE TABLET BY MOUTH TWICE A  tablet 2   • colchicine 0.6 MG tablet Take 0.6 mg by mouth Daily.     • [DISCONTINUED] warfarin (COUMADIN) 5 MG tablet Take 5 mg by mouth Daily.       Current Facility-Administered Medications on File Prior to Visit   Medication Dose Route Frequency Provider Last Rate Last Dose   • ipratropium-albuterol (DUO-NEB) nebulizer solution 3 mL  3 mL Nebulization 4x Daily - RT Mary Avendaño PA-C       • ipratropium-albuterol (DUO-NEB) nebulizer solution 3 mL  3 mL Nebulization 4x Daily - RT Mary Avendaño PA-C           Past Medical History:   Diagnosis Date   • Allergic    • Constipation    • Diastasis recti    • Flushing    • Gout    • Hyperlipidemia    • Hypertension    • Myocardial infarction (CMS/HCC)    • Obesity    • ERICK (obstructive sleep apnea)    • PFO (patent foramen ovale)    • Pneumonia    • Prostate cancer (CMS/HCC)     s/p XRT   • TIA (transient ischemic attack)    • Tubular adenoma of colon        Past Surgical History:   Procedure Laterality Date   • CARDIAC CATHETERIZATION     • CARDIAC DEFIBRILLATOR PLACEMENT  07/22/2019   • CATARACT EXTRACTION     • COLONOSCOPY  2018   • CORONARY ARTERY BYPASS GRAFT     • HEMORRHOIDECTOMY     • HERNIA REPAIR     • PACEMAKER IMPLANTATION     • REPLACEMENT TOTAL KNEE Bilateral    • ROTATOR CUFF REPAIR Right    • SHOULDER SURGERY Left     reversal       Family History   Problem Relation Age of Onset   • Alcohol abuse Mother    • Arthritis Mother    • Colon cancer Mother    • Rectal cancer Mother    • Alcohol abuse Father    • Arthritis Father    • Lung cancer Father        Social History     Socioeconomic History   • Marital status:      Spouse name: Not on file   • Number of children: Not on file   • Years of  "education: Not on file   • Highest education level: Not on file   Tobacco Use   • Smoking status: Former Smoker     Packs/day: 1.00     Years: 30.00     Pack years: 30.00     Types: Cigarettes     Last attempt to quit: 1997     Years since quittin.7   • Smokeless tobacco: Never Used   Substance and Sexual Activity   • Alcohol use: No     Frequency: Never     Comment: rarely   • Drug use: No   • Sexual activity: Defer           The following portions of the patient's history were reviewed and updated as appropriate: problem list, allergies, current medications, past medical history, past family history, past social history and past surgical history.    Review of Systems   Respiratory: Negative for shortness of breath.    Cardiovascular: Negative for chest pain.       Immunization History   Administered Date(s) Administered   • FLUAD TRI 65YR+ 10/08/2019   • Flu Vaccine High Dose PF 65YR+ 10/08/2018   • Hepatitis A 10/01/2018, 2019   • Pneumococcal Conjugate 13-Valent (PCV13) 10/03/2016   • Pneumococcal Polysaccharide (PPSV23) 10/28/2014   • Tdap 10/14/2015       Objective   Vitals:    10/08/19 1353   BP: 126/82   Weight: 126 kg (277 lb 6.4 oz)   Height: 172.7 cm (68\")     Physical Exam   Constitutional: He appears well-developed and well-nourished.   HENT:   Head: Normocephalic and atraumatic.   Cardiovascular: Normal rate, regular rhythm, S1 normal, S2 normal and normal heart sounds.   Pulmonary/Chest: Effort normal and breath sounds normal.   Neurological: He is alert.   Skin: Skin is warm.   Psychiatric: He has a normal mood and affect.   Vitals reviewed.      Procedures    Assessment/Plan   Silvino was seen today for follow-up, hyperlipidemia and med refill.    Diagnoses and all orders for this visit:    Essential hypertension    Congestive heart failure with LV diastolic dysfunction, NYHA class 2 (CMS/HCC)    Morbidly obese (CMS/HCC)    Hyperlipidemia, unspecified hyperlipidemia type    Other " orders  -     Fluad Tri 63yr ([id number])             LDL is at goal. BP is good. Recc avoid salt and lose weight to help his heart.    Return in about 6 months (around 4/8/2020).

## 2019-10-10 ENCOUNTER — ANTICOAGULATION VISIT (OUTPATIENT)
Dept: INTERNAL MEDICINE | Facility: CLINIC | Age: 69
End: 2019-10-10

## 2019-10-10 DIAGNOSIS — I48.0 PAROXYSMAL ATRIAL FIBRILLATION (HCC): ICD-10-CM

## 2019-10-10 PROBLEM — I48.91 ATRIAL FIBRILLATION: Status: RESOLVED | Noted: 2019-04-19 | Resolved: 2019-10-10

## 2020-03-10 ENCOUNTER — OFFICE VISIT (OUTPATIENT)
Dept: INTERNAL MEDICINE | Facility: CLINIC | Age: 70
End: 2020-03-10

## 2020-03-10 ENCOUNTER — TELEPHONE (OUTPATIENT)
Dept: INTERNAL MEDICINE | Facility: CLINIC | Age: 70
End: 2020-03-10

## 2020-03-10 VITALS
DIASTOLIC BLOOD PRESSURE: 80 MMHG | BODY MASS INDEX: 41.01 KG/M2 | HEIGHT: 68 IN | SYSTOLIC BLOOD PRESSURE: 130 MMHG | WEIGHT: 270.6 LBS

## 2020-03-10 DIAGNOSIS — R51.9 ACUTE NONINTRACTABLE HEADACHE, UNSPECIFIED HEADACHE TYPE: Primary | ICD-10-CM

## 2020-03-10 DIAGNOSIS — R41.3 MEMORY CHANGES: ICD-10-CM

## 2020-03-10 DIAGNOSIS — R73.01 IMPAIRED FASTING GLUCOSE: ICD-10-CM

## 2020-03-10 PROCEDURE — 99213 OFFICE O/P EST LOW 20 MIN: CPT | Performed by: PHYSICIAN ASSISTANT

## 2020-03-10 RX ORDER — SPIRONOLACTONE 25 MG/1
25 TABLET ORAL DAILY
COMMUNITY
End: 2020-06-01

## 2020-03-10 NOTE — PROGRESS NOTES
Subjective   Chief Complaint   Patient presents with   • Eye Pain     right eye   • Headache   • Memory Loss       History of Present Illness     Has had a headache since Saturday right side of his head above his right eye. May have came on all of a sudden, but they are not sure. It went away one time and then came back.     He has taken something otc for it, but is unable to remember what it was exactly. May have taken tylenol. His wife is with him as well and she is not sure.     This morning was look at his phone and realized he did not know how to work his phone. Has been this way for a few days. He has not had any new vision changes, light or sound sensitivity. He has been having dizziness for several months, this is not new.     He keeps asking his wife questions today and has been repeating the same things.     He does have pain behind his right eye currently. No vision changes in the last 24 to 48 hours. Having some nausea as well that lasts about 30 seconds as well for the last few months. He has been evaluated by ent for vertigo.     No slurred speech recently over the last few days. No weakness in an extremity.     Feels like he has a weak tongue for several months. No recent change.     His only recent change from baseline is his right sided headache. He has no jaw pain. Has some mild tenderness around the scalp.     They are also both concerned with his memory over the last several months.     He states he also sometimes hears a train sound in his right ear, this is not new.      Patient Active Problem List   Diagnosis   • ERICK (obstructive sleep apnea)   • Edema   • H/O total shoulder replacement   • Hyperlipidemia   • Ischemic cardiomyopathy   • Morbidly obese (CMS/[facility])   • PFO (patent foramen ovale)   • RBBB (right bundle branch block)   • Rotator cuff arthropathy   • S/P CABG (coronary artery bypass graft)   • Stroke (CMS/[facility])   • Trifascicular bundle branch block   • VT (ventricular tachycardia)  (CMS/[facility])   • CAD (coronary artery disease)   • Hypertension   • Prostate cancer (CMS/[facility])       No Known Allergies    Current Outpatient Medications on File Prior to Visit   Medication Sig Dispense Refill   • allopurinol (ZYLOPRIM) 300 MG tablet Take 300 mg by mouth Daily.     • atorvastatin (LIPITOR) 10 MG tablet Take 10 mg by mouth Daily.     • carvedilol (COREG) 25 MG tablet Take 25 mg by mouth 2 (Two) Times a Day.     • colchicine 0.6 MG tablet Take 0.6 mg by mouth Daily.     • esomeprazole (nexIUM) 40 MG capsule Take 40 mg by mouth Daily.     • isosorbide mononitrate (IMDUR) 30 MG 24 hr tablet Take 30 mg by mouth Daily.     • nitroglycerin (NITROSTAT) 0.4 MG SL tablet Take 0.4 mg by mouth As Needed.     • spironolactone (ALDACTONE) 25 MG tablet Take 25 mg by mouth Daily.     • albuterol (PROVENTIL) (2.5 MG/3ML) 0.083% nebulizer solution Take 2.5 mg by nebulization Every 4 (Four) Hours As Needed for Wheezing. 60 vial 0   • aspirin 81 MG chewable tablet Chew 81 mg Daily.     • docusate sodium (COLACE) 100 MG capsule Take 100 mg by mouth As Needed.     • flurbiprofen (OCUFEN) 0.03 % ophthalmic solution Administer 0.3 drops to both eyes As Needed.     • fluticasone (VERAMYST) 27.5 MCG/SPRAY nasal spray 2 sprays into the nostril(s) as directed by provider Daily.     • furosemide (LASIX) 40 MG tablet TAKE ONE TABLET BY MOUTH TWICE A  tablet 2   • potassium chloride (K-DUR) 10 MEQ CR tablet TAKE ONE TABLET BY MOUTH TWICE A  tablet 2     Current Facility-Administered Medications on File Prior to Visit   Medication Dose Route Frequency Provider Last Rate Last Dose   • ipratropium-albuterol (DUO-NEB) nebulizer solution 3 mL  3 mL Nebulization 4x Daily - RT Mary Avendaño PA-C       • ipratropium-albuterol (DUO-NEB) nebulizer solution 3 mL  3 mL Nebulization 4x Daily - RT Mary Avendaño PA-C           Past Medical History:   Diagnosis Date   • Allergic    • Constipation    • Diastasis recti    • Flushing     • Gout    • Hyperlipidemia    • Hypertension    • Myocardial infarction (CMS/HCC)    • Obesity    • ERICK (obstructive sleep apnea)    • PFO (patent foramen ovale)    • Pneumonia    • Prostate cancer (CMS/HCC)     s/p XRT   • TIA (transient ischemic attack)    • Tubular adenoma of colon        Family History   Problem Relation Age of Onset   • Alcohol abuse Mother    • Arthritis Mother    • Colon cancer Mother    • Rectal cancer Mother    • Alcohol abuse Father    • Arthritis Father    • Lung cancer Father        Social History     Socioeconomic History   • Marital status:      Spouse name: Not on file   • Number of children: Not on file   • Years of education: Not on file   • Highest education level: Not on file   Tobacco Use   • Smoking status: Former Smoker     Packs/day: 1.00     Years: 30.00     Pack years: 30.00     Types: Cigarettes     Last attempt to quit: 1997     Years since quittin.2   • Smokeless tobacco: Never Used   Substance and Sexual Activity   • Alcohol use: No     Frequency: Never     Comment: rarely   • Drug use: No   • Sexual activity: Defer       Past Surgical History:   Procedure Laterality Date   • CARDIAC CATHETERIZATION     • CARDIAC DEFIBRILLATOR PLACEMENT  2019   • CATARACT EXTRACTION     • COLONOSCOPY     • CORONARY ARTERY BYPASS GRAFT     • HEMORRHOIDECTOMY     • HERNIA REPAIR     • PACEMAKER IMPLANTATION     • REPLACEMENT TOTAL KNEE Bilateral    • ROTATOR CUFF REPAIR Right    • SHOULDER SURGERY Left     reversal     The following portions of the patient's history were reviewed and updated as appropriate: problem list, allergies, current medications, past medical history, past family history, past social history and past surgical history.    Review of Systems   Constitution: Negative for malaise/fatigue.   Eyes: Negative for blurred vision, double vision, pain, vision loss in left eye, vision loss in right eye, visual disturbance and visual halos.  "  Cardiovascular: Negative for chest pain.   Neurological: Positive for difficulty with concentration, dizziness and headaches. Negative for disturbances in coordination, focal weakness, light-headedness, loss of balance, sensory change, tremors, vertigo and weakness.       Immunization History   Administered Date(s) Administered   • FLUAD TRI 65YR+ 10/08/2019   • Fluzone High Dose =>65 Years ([employer] ONLY) 10/08/2018   • Hepatitis A 10/01/2018, 05/01/2019   • Pneumococcal Conjugate 13-Valent (PCV13) 10/03/2016   • Pneumococcal Polysaccharide (PPSV23) 10/28/2014   • Tdap 10/14/2015       Objective   Vitals:    03/10/20 1430 03/10/20 1455   BP:  130/80   Weight: 123 kg (270 lb 9.6 oz)    Height: 172.7 cm (68\")      Body mass index is 41.14 kg/m².  Physical Exam   Constitutional: He is oriented to person, place, and time. He appears well-developed and well-nourished.   HENT:   Head: Normocephalic and atraumatic.   Mouth/Throat: Oropharynx is clear and moist.   No jaw claudication. ttp over right temporal artery   Eyes: Pupils are equal, round, and reactive to light. Conjunctivae and EOM are normal.   Cardiovascular: Normal rate, regular rhythm, S1 normal, S2 normal and normal heart sounds.   Pulmonary/Chest: Effort normal and breath sounds normal.   Neurological: He is alert and oriented to person, place, and time. He displays normal reflexes. No cranial nerve deficit. He exhibits normal muscle tone. Coordination normal.   Skin: Skin is warm and dry.   Psychiatric: He has a normal mood and affect. His behavior is normal. Judgment and thought content normal.   Vitals reviewed.    Assessment/Plan   Silvino was seen today for eye pain, headache and memory loss.    Diagnoses and all orders for this visit:    Acute nonintractable headache, unspecified headache type  -     C-reactive Protein  -     Sedimentation Rate  -     MRI Brain With & Without Contrast; Future    Memory changes  -     Hemoglobin A1c  -     TSH  -     " Vitamin B12  -     RPR    Impaired fasting glucose   -     Hemoglobin A1c      New headache onset 3 days ago. ESR and CRP today, will schedule MRI for tomorrow morning. He is not currently on ASA or anticoagulation anymore.     Memory changes. A1c, TSH, B12 and RPR today as well to evaluate.

## 2020-03-10 NOTE — TELEPHONE ENCOUNTER
Wife calling on pt having a lot of issues with pain over the right eye and headaches please advise and memory issues also

## 2020-03-11 ENCOUNTER — APPOINTMENT (OUTPATIENT)
Dept: MRI IMAGING | Facility: HOSPITAL | Age: 70
End: 2020-03-11

## 2020-03-11 DIAGNOSIS — M31.6 TEMPORAL ARTERITIS (HCC): Primary | ICD-10-CM

## 2020-03-11 LAB
CRP SERPL-MCNC: 4.2 MG/DL (ref 0–0.5)
ERYTHROCYTE [SEDIMENTATION RATE] IN BLOOD BY WESTERGREN METHOD: 62 MM/HR (ref 0–20)
HBA1C MFR BLD: 5.7 % (ref 4.8–5.6)
RPR SER QL: NORMAL
TSH SERPL DL<=0.005 MIU/L-ACNC: 2.46 UIU/ML (ref 0.27–4.2)
VIT B12 SERPL-MCNC: 471 PG/ML (ref 211–946)

## 2020-03-11 RX ORDER — PREDNISONE 20 MG/1
TABLET ORAL
Qty: 42 TABLET | Refills: 0 | Status: SHIPPED | OUTPATIENT
Start: 2020-03-11 | End: 2020-03-23

## 2020-03-21 DIAGNOSIS — R06.02 SOB (SHORTNESS OF BREATH): ICD-10-CM

## 2020-03-21 DIAGNOSIS — R06.2 WHEEZING: ICD-10-CM

## 2020-03-21 RX ORDER — ALBUTEROL SULFATE 2.5 MG/3ML
2.5 SOLUTION RESPIRATORY (INHALATION) EVERY 4 HOURS PRN
Qty: 30 VIAL | Refills: 1 | Status: SHIPPED | OUTPATIENT
Start: 2020-03-21 | End: 2020-03-23 | Stop reason: SDUPTHER

## 2020-03-23 ENCOUNTER — HOSPITAL ENCOUNTER (OUTPATIENT)
Dept: CT IMAGING | Facility: HOSPITAL | Age: 70
Discharge: HOME | End: 2020-03-23
Admitting: PHYSICIAN ASSISTANT

## 2020-03-23 ENCOUNTER — TELEPHONE (OUTPATIENT)
Dept: INTERNAL MEDICINE | Facility: CLINIC | Age: 70
End: 2020-03-23

## 2020-03-23 ENCOUNTER — OFFICE VISIT (OUTPATIENT)
Dept: INTERNAL MEDICINE | Facility: CLINIC | Age: 70
End: 2020-03-23

## 2020-03-23 VITALS
BODY MASS INDEX: 40.38 KG/M2 | HEIGHT: 68 IN | SYSTOLIC BLOOD PRESSURE: 124 MMHG | WEIGHT: 266.4 LBS | DIASTOLIC BLOOD PRESSURE: 80 MMHG

## 2020-03-23 DIAGNOSIS — J40 BRONCHITIS: Primary | ICD-10-CM

## 2020-03-23 DIAGNOSIS — R51.9 NONINTRACTABLE HEADACHE, UNSPECIFIED CHRONICITY PATTERN, UNSPECIFIED HEADACHE TYPE: ICD-10-CM

## 2020-03-23 DIAGNOSIS — H53.9 VISION CHANGES: ICD-10-CM

## 2020-03-23 DIAGNOSIS — R06.02 SOB (SHORTNESS OF BREATH): ICD-10-CM

## 2020-03-23 DIAGNOSIS — M25.519 SHOULDER PAIN, UNSPECIFIED CHRONICITY, UNSPECIFIED LATERALITY: Primary | ICD-10-CM

## 2020-03-23 DIAGNOSIS — R06.2 WHEEZING: ICD-10-CM

## 2020-03-23 PROCEDURE — 99214 OFFICE O/P EST MOD 30 MIN: CPT | Performed by: PHYSICIAN ASSISTANT

## 2020-03-23 PROCEDURE — 25010000002 IOPAMIDOL 61 % SOLUTION: Performed by: PHYSICIAN ASSISTANT

## 2020-03-23 PROCEDURE — 70496 CT ANGIOGRAPHY HEAD: CPT

## 2020-03-23 PROCEDURE — 82565 ASSAY OF CREATININE: CPT

## 2020-03-23 PROCEDURE — 70498 CT ANGIOGRAPHY NECK: CPT

## 2020-03-23 RX ORDER — ALBUTEROL SULFATE 2.5 MG/3ML
2.5 SOLUTION RESPIRATORY (INHALATION) EVERY 4 HOURS PRN
Qty: 30 VIAL | Refills: 1 | Status: SHIPPED | OUTPATIENT
Start: 2020-03-23

## 2020-03-23 RX ADMIN — IOPAMIDOL 100 ML: 612 INJECTION, SOLUTION INTRAVENOUS at 14:54

## 2020-03-23 NOTE — TELEPHONE ENCOUNTER
I spoke with Dr. Bridges, on call for Dr. Franz, patient's ENT on Saturday 3/21/20 regarding pathology results from his temporal artery biopsy on 3/11/20. His pathology was negative for acute inflammation etc. I advised patient and his wife to stop the prednisone at that time. He had been on 60 mg daily for 10 days at the time. He had mentioned in conversation on Saturday that the prednisone seemed to really help his shoulder pain.     I asked the patient to come in this afternoon to discuss symptoms further including possible PMR etc.     Wife called back after appointment was made reporting that the patient was having changes in his vision and difficulty seeing. She was advised to take Mr. Hidalgo to the ER immediately.

## 2020-03-23 NOTE — PROGRESS NOTES
Subjective   Chief Complaint   Patient presents with   • Vision Disturbance     right eye     History of Present Illness   Pt here today for fup. He had right sided headache for several days and was seen by myself on 3/10. He had an elevated sed rate of 0.62 and an elevated CRP of 4.2. Along with presenting right sided temporal pain GCA was suspected. He was started on Prednisone 60 mg daily and referred to his ENT Dr. Franz who performed a temporal artery biopsy on 3/11/20.    His pathology was resulted over the weekend and was negative for acute inflammation. I spoke with the patient and subsequent d/c the prednisone. He has been off this for 2 days now.     Pt states that he had been having shoulder pain for several months that was improved with the prednisone. He notes he has had shoulder pain for years, but worse in the last few months. He had been going to PT as well. It is of note that he has had bilateral shoulder surgery for various previous problems.     He has had no other joint or hip pain. His right sided headache did resolve. Still has a mild headache on the top of his head.     After talking to the patient this morning and asking him to come back in to the office to be evaluated for possible PMR etc. He developed right peripheral vision loss that lasted for about 45 minutes. No curtain shade phenomenon. No sx on the left, only right eye. No pain present. No double vision or blurred vision. No slurred speech or weakness in any extremity. He has a hx of floaters, not worse by this.     After 45 minutes and 4 ASA 81 mg later, vision returned to nml.     It is of note he has a history of stroke.     He does have an opthalmologyist. He sees Dr. Simone Morin.      Patient Active Problem List   Diagnosis   • ERICK (obstructive sleep apnea)   • Edema   • H/O total shoulder replacement   • Hyperlipidemia   • Ischemic cardiomyopathy   • Morbidly obese (CMS/HCC)   • PFO (patent foramen ovale)   • RBBB (right bundle  branch block)   • Rotator cuff arthropathy   • S/P CABG (coronary artery bypass graft)   • Stroke (CMS/[facility])   • Trifascicular bundle branch block   • VT (ventricular tachycardia) (CMS/[facility])   • CAD (coronary artery disease)   • Hypertension   • Prostate cancer (CMS/[facility])     No Known Allergies    Current Outpatient Medications on File Prior to Visit   Medication Sig Dispense Refill   • allopurinol (ZYLOPRIM) 300 MG tablet Take 300 mg by mouth Daily.     • aspirin 81 MG chewable tablet Chew 81 mg Daily.     • atorvastatin (LIPITOR) 10 MG tablet Take 10 mg by mouth Daily.     • carvedilol (COREG) 25 MG tablet Take 25 mg by mouth 2 (Two) Times a Day.     • colchicine 0.6 MG tablet Take 0.6 mg by mouth Daily.     • docusate sodium (COLACE) 100 MG capsule Take 100 mg by mouth As Needed.     • esomeprazole (nexIUM) 40 MG capsule Take 40 mg by mouth Daily.     • flurbiprofen (OCUFEN) 0.03 % ophthalmic solution Administer 0.3 drops to both eyes As Needed.     • fluticasone (VERAMYST) 27.5 MCG/SPRAY nasal spray 2 sprays into the nostril(s) as directed by provider Daily.     • furosemide (LASIX) 40 MG tablet TAKE ONE TABLET BY MOUTH TWICE A  tablet 2   • isosorbide mononitrate (IMDUR) 30 MG 24 hr tablet Take 30 mg by mouth Daily.     • nitroglycerin (NITROSTAT) 0.4 MG SL tablet Take 0.4 mg by mouth As Needed.     • potassium chloride (K-DUR) 10 MEQ CR tablet TAKE ONE TABLET BY MOUTH TWICE A  tablet 2   • spironolactone (ALDACTONE) 25 MG tablet Take 25 mg by mouth Daily.     • [DISCONTINUED] albuterol (PROVENTIL) (2.5 MG/3ML) 0.083% nebulizer solution Take 2.5 mg by nebulization Every 4 (Four) Hours As Needed for Wheezing. 30 vial 1   • [DISCONTINUED] predniSONE (DELTASONE) 20 MG tablet Take 3 tablets daily x 2 weeks 42 tablet 0     Current Facility-Administered Medications on File Prior to Visit   Medication Dose Route Frequency Provider Last Rate Last Dose   • ipratropium-albuterol (DUO-NEB) nebulizer solution 3  mL  3 mL Nebulization 4x Daily - RT AvendañoMary PA-C       • ipratropium-albuterol (DUO-NEB) nebulizer solution 3 mL  3 mL Nebulization 4x Daily - RT AvendañoMary PA-C           Past Medical History:   Diagnosis Date   • Allergic    • Constipation    • Diastasis recti    • Flushing    • Gout    • Hyperlipidemia    • Hypertension    • Myocardial infarction (CMS/HCC)    • Obesity    • ERICK (obstructive sleep apnea)    • PFO (patent foramen ovale)    • Pneumonia    • Prostate cancer (CMS/HCC)     s/p XRT   • TIA (transient ischemic attack)    • Tubular adenoma of colon        Family History   Problem Relation Age of Onset   • Alcohol abuse Mother    • Arthritis Mother    • Colon cancer Mother    • Rectal cancer Mother    • Alcohol abuse Father    • Arthritis Father    • Lung cancer Father        Social History     Socioeconomic History   • Marital status:      Spouse name: Not on file   • Number of children: Not on file   • Years of education: Not on file   • Highest education level: Not on file   Tobacco Use   • Smoking status: Former Smoker     Packs/day: 1.00     Years: 30.00     Pack years: 30.00     Types: Cigarettes     Last attempt to quit: 1997     Years since quittin.2   • Smokeless tobacco: Never Used   Substance and Sexual Activity   • Alcohol use: No     Frequency: Never     Comment: rarely   • Drug use: No   • Sexual activity: Defer       Past Surgical History:   Procedure Laterality Date   • CARDIAC CATHETERIZATION     • CARDIAC DEFIBRILLATOR PLACEMENT  2019   • CATARACT EXTRACTION     • COLONOSCOPY     • CORONARY ARTERY BYPASS GRAFT     • HEMORRHOIDECTOMY     • HERNIA REPAIR     • PACEMAKER IMPLANTATION     • REPLACEMENT TOTAL KNEE Bilateral    • ROTATOR CUFF REPAIR Right    • SHOULDER SURGERY Left     reversal     The following portions of the patient's history were reviewed and updated as appropriate: problem list, allergies, current medications, past medical history, past  "family history, past social history and past surgical history.    Review of Systems   Constitution: Negative for chills and fever.   Eyes: Negative for double vision.   Cardiovascular: Negative for chest pain and dyspnea on exertion.   Musculoskeletal:        Shoulder pain   Neurological: Positive for headaches. Negative for disturbances in coordination, dizziness, focal weakness, light-headedness, vertigo and weakness.     Immunization History   Administered Date(s) Administered   • FLUAD TRI 65YR+ 10/08/2019   • Fluzone High Dose =>65 Years ([employer] ONLY) 10/08/2018   • Hepatitis A 10/01/2018, 05/01/2019   • Pneumococcal Conjugate 13-Valent (PCV13) 10/03/2016   • Pneumococcal Polysaccharide (PPSV23) 10/28/2014   • Tdap 10/14/2015     Objective   Vitals:    03/23/20 1258 03/23/20 1316   BP:  124/80   Weight: 121 kg (266 lb 6.4 oz)    Height: 172.7 cm (68\")      Body mass index is 40.51 kg/m².  Physical Exam   Constitutional: He is oriented to person, place, and time. He appears well-developed and well-nourished.   HENT:   Head: Normocephalic and atraumatic.   Eyes: Pupils are equal, round, and reactive to light. Conjunctivae and EOM are normal.   Peripheral vision intact   Neck: Carotid bruit is not present.   Cardiovascular: Normal rate and regular rhythm.   Pulmonary/Chest: Effort normal and breath sounds normal.   Neurological: He is alert and oriented to person, place, and time. No cranial nerve deficit.   Vitals reviewed.        Assessment/Plan   Silvino was seen today for vision disturbance.    Diagnoses and all orders for this visit:    Shoulder pain, unspecified chronicity, unspecified laterality  -     C-reactive Protein  -     Sedimentation Rate  -     ERIKA by IFA, Reflex 9-biomarkers profile  -     Rheumatoid Factor  -     Cyclic Citrul Peptide Antibody, IgG / IgA    Nonintractable headache, unspecified chronicity pattern, unspecified headache type  -     CT Angiogram Head With Contrast  -     CT Angiogram " Neck With & Without Contrast    Vision changes  -     CT Angiogram Head With Contrast  -     CT Angiogram Neck With & Without Contrast      As CRP and sed rate were elevated will recheck today. I am also getting imaging stat due to new vision symptoms. He has a non MRI compatible ACID, will proceed with CTA head and neck today. Also check pt for any underlying rheumatologic conditions that could be contributing.

## 2020-03-23 NOTE — TELEPHONE ENCOUNTER
FYI-Patient's wife called upset stating her  is losing his vision now.I have instructed her to take him to the emergency room or call an ambulance. She is taking him now.

## 2020-03-24 DIAGNOSIS — J32.9 SINUSITIS, UNSPECIFIED CHRONICITY, UNSPECIFIED LOCATION: Primary | ICD-10-CM

## 2020-03-24 LAB — CREAT BLDA-MCNC: 0.9 MG/DL (ref 0.6–1.3)

## 2020-03-24 RX ORDER — AMOXICILLIN AND CLAVULANATE POTASSIUM 875; 125 MG/1; MG/1
1 TABLET, FILM COATED ORAL 2 TIMES DAILY
Qty: 28 TABLET | Refills: 0 | Status: SHIPPED | OUTPATIENT
Start: 2020-03-24 | End: 2020-04-07

## 2020-03-25 LAB
ANA HOMOGEN TITR SER: NORMAL {TITER}
ANA TITR SER IF: POSITIVE {TITER}
CCP IGA+IGG SERPL IA-ACNC: 12 UNITS (ref 0–19)
CENTROMERE B AB SER-ACNC: <0.2 AI (ref 0–0.9)
CHROMATIN AB SERPL-ACNC: <0.2 AI (ref 0–0.9)
CRP SERPL-MCNC: 0.63 MG/DL (ref 0–0.5)
DSDNA AB SER-ACNC: <1 IU/ML (ref 0–9)
ENA JO1 AB SER-ACNC: <0.2 AI (ref 0–0.9)
ENA RNP AB SER-ACNC: 0.3 AI (ref 0–0.9)
ENA SCL70 AB SER-ACNC: <0.2 AI (ref 0–0.9)
ENA SM AB SER-ACNC: <0.2 AI (ref 0–0.9)
ENA SS-A AB SER-ACNC: <0.2 AI (ref 0–0.9)
ENA SS-B AB SER-ACNC: <0.2 AI (ref 0–0.9)
ERYTHROCYTE [SEDIMENTATION RATE] IN BLOOD BY WESTERGREN METHOD: 8 MM/HR (ref 0–20)
LABORATORY COMMENT REPORT: ABNORMAL
Lab: NORMAL
Lab: NORMAL
RHEUMATOID FACT SERPL-ACNC: 22.6 IU/ML (ref 0–13.9)

## 2020-03-31 ENCOUNTER — TELEPHONE (OUTPATIENT)
Dept: INTERNAL MEDICINE | Facility: CLINIC | Age: 70
End: 2020-03-31

## 2020-03-31 NOTE — TELEPHONE ENCOUNTER
Called and spoke with patient, informed him we received a letter from \[facility]\"" Rheumatology stating he declined a virtual visit.  I encouraged him to set up [facility] and proceed with the virtual visit.  I resend a code to his email to activate [employer].  Patient stated he would try to do this.  He is aware Dr. Rutherford highly recommends this visit.

## 2020-04-02 ENCOUNTER — TELEPHONE (OUTPATIENT)
Dept: INTERNAL MEDICINE | Facility: CLINIC | Age: 70
End: 2020-04-02

## 2020-04-16 ENCOUNTER — TELEPHONE (OUTPATIENT)
Dept: INTERNAL MEDICINE | Facility: CLINIC | Age: 70
End: 2020-04-16

## 2020-04-16 NOTE — TELEPHONE ENCOUNTER
PTS WIFE CALLED STATING ANOTHER DOCTOR STATES SHE HAS GIANT CELL ARTERITIS. PT IS GOING TO HAVE TO TAKE PREDNISONE AND WIFE DOES NOT WANT PT TO TAKE MEDICATION IF HE DOESN'T NEED TO.     PLEASE ADVISE     CALL BACK   [phone]

## 2020-04-16 NOTE — TELEPHONE ENCOUNTER
I spoke with Dr. Price two weeks ago. He feels that Mr. Hidalgo has giant cell arteritis and should take Prednisone to prevent blindness with it. If there is any concern, they need to call his office. I am sure he could do another telemedicine visit with him to help clarify more.

## 2020-06-01 ENCOUNTER — OFFICE VISIT (OUTPATIENT)
Dept: INTERNAL MEDICINE | Facility: CLINIC | Age: 70
End: 2020-06-01

## 2020-06-01 VITALS
WEIGHT: 265 LBS | BODY MASS INDEX: 40.16 KG/M2 | DIASTOLIC BLOOD PRESSURE: 80 MMHG | HEIGHT: 68 IN | SYSTOLIC BLOOD PRESSURE: 144 MMHG

## 2020-06-01 DIAGNOSIS — E66.01 MORBIDLY OBESE (HCC): ICD-10-CM

## 2020-06-01 DIAGNOSIS — G47.33 OSA (OBSTRUCTIVE SLEEP APNEA): ICD-10-CM

## 2020-06-01 DIAGNOSIS — M31.6 TEMPORAL ARTERITIS (HCC): ICD-10-CM

## 2020-06-01 DIAGNOSIS — I10 ESSENTIAL HYPERTENSION: Primary | ICD-10-CM

## 2020-06-01 PROCEDURE — 99214 OFFICE O/P EST MOD 30 MIN: CPT | Performed by: INTERNAL MEDICINE

## 2020-06-01 RX ORDER — LISINOPRIL 10 MG/1
10 TABLET ORAL DAILY
Qty: 30 TABLET | Refills: 3 | Status: SHIPPED | OUTPATIENT
Start: 2020-06-01 | End: 2020-07-17

## 2020-06-01 RX ORDER — PREDNISONE 20 MG/1
TABLET ORAL
COMMUNITY
Start: 2020-03-11 | End: 2020-06-01

## 2020-06-01 RX ORDER — PREDNISONE 10 MG/1
TABLET ORAL
COMMUNITY
Start: 2020-05-15 | End: 2020-11-17

## 2020-06-01 NOTE — PROGRESS NOTES
Subjective        Chief Complaint   Patient presents with   • Hyperlipidemia   • Hypertension           Silvino Hidalgo Jr. is a 70 y.o. male who presents for    Patient Active Problem List   Diagnosis   • ERICK (obstructive sleep apnea)   • Edema   • H/O total shoulder replacement   • Hyperlipidemia   • Ischemic cardiomyopathy   • Morbidly obese (CMS/[facility])   • PFO (patent foramen ovale)   • RBBB (right bundle branch block)   • Rotator cuff arthropathy   • S/P CABG (coronary artery bypass graft)   • Stroke (CMS/[facility])   • Trifascicular bundle branch block   • VT (ventricular tachycardia) (CMS/[facility])   • CAD (coronary artery disease)   • Hypertension   • Prostate cancer (CMS/[facility])   • Temporal arteritis (CMS/[facility])       History of Present Illness     He is on 25 mg of Prednisone for GCA; Dr. Price is weaning him down.He is not having blind spots in his vision now.  His shoulders and temples do not hurt now. He denies chest pain. He has some dyspnea which is not new. He sleeps on one pillow. His edema has resolved with Lasix. He uses a CPAP machine nightly.   No Known Allergies    Current Outpatient Medications on File Prior to Visit   Medication Sig Dispense Refill   • albuterol (PROVENTIL) (2.5 MG/3ML) 0.083% nebulizer solution Take 2.5 mg by nebulization Every 4 (Four) Hours As Needed for Wheezing. 30 vial 1   • allopurinol (ZYLOPRIM) 300 MG tablet Take 300 mg by mouth Daily.     • aspirin 81 MG chewable tablet Chew 81 mg Daily.     • atorvastatin (LIPITOR) 10 MG tablet Take 10 mg by mouth Daily.     • carvedilol (COREG) 25 MG tablet Take 25 mg by mouth 2 (Two) Times a Day.     • colchicine 0.6 MG tablet Take 0.6 mg by mouth Daily.     • docusate sodium (COLACE) 100 MG capsule Take 100 mg by mouth As Needed.     • esomeprazole (nexIUM) 40 MG capsule Take 40 mg by mouth Daily.     • flurbiprofen (OCUFEN) 0.03 % ophthalmic solution Administer 0.3 drops to both eyes As Needed.     • fluticasone (VERAMYST) 27.5 MCG/SPRAY nasal  spray 2 sprays into the nostril(s) as directed by provider Daily.     • furosemide (LASIX) 40 MG tablet TAKE ONE TABLET BY MOUTH TWICE A DAY (Patient taking differently: Daily.) 180 tablet 2   • isosorbide mononitrate (IMDUR) 30 MG 24 hr tablet Take 30 mg by mouth Daily.     • nitroglycerin (NITROSTAT) 0.4 MG SL tablet Take 0.4 mg by mouth As Needed.     • potassium chloride (K-DUR) 10 MEQ CR tablet TAKE ONE TABLET BY MOUTH TWICE A DAY (Patient taking differently: 10 mEq.) 180 tablet 2   • predniSONE (DELTASONE) 10 MG tablet Patient took 4 10's for 3 weeks  3 10's for 2 weeks  2.5 tablets for two weeks June 4th patient will go to   2 tablets for 2 weeks     • [DISCONTINUED] predniSONE (DELTASONE) 20 MG tablet      • [DISCONTINUED] spironolactone (ALDACTONE) 25 MG tablet Take 25 mg by mouth Daily.       Current Facility-Administered Medications on File Prior to Visit   Medication Dose Route Frequency Provider Last Rate Last Dose   • ipratropium-albuterol (DUO-NEB) nebulizer solution 3 mL  3 mL Nebulization 4x Daily - RT Mary Avendaño PA-C       • ipratropium-albuterol (DUO-NEB) nebulizer solution 3 mL  3 mL Nebulization 4x Daily - RT Mary Avendaño PA-C           Past Medical History:   Diagnosis Date   • Allergic    • Constipation    • Diastasis recti    • Flushing    • Gout    • Hyperlipidemia    • Hypertension    • Myocardial infarction (CMS/HCC)    • Obesity    • ERICK (obstructive sleep apnea)    • PFO (patent foramen ovale)    • Pneumonia    • Prostate cancer (CMS/HCC)     s/p XRT   • TIA (transient ischemic attack)    • Tubular adenoma of colon        Past Surgical History:   Procedure Laterality Date   • CARDIAC CATHETERIZATION     • CARDIAC DEFIBRILLATOR PLACEMENT  07/22/2019   • CATARACT EXTRACTION     • COLONOSCOPY  2018   • CORONARY ARTERY BYPASS GRAFT     • HEMORRHOIDECTOMY     • HERNIA REPAIR     • PACEMAKER IMPLANTATION     • REPLACEMENT TOTAL KNEE Bilateral    • ROTATOR CUFF REPAIR Right    • SHOULDER  "SURGERY Left     reversal       Family History   Problem Relation Age of Onset   • Alcohol abuse Mother    • Arthritis Mother    • Colon cancer Mother    • Rectal cancer Mother    • Alcohol abuse Father    • Arthritis Father    • Lung cancer Father        Social History     Socioeconomic History   • Marital status:      Spouse name: Not on file   • Number of children: Not on file   • Years of education: Not on file   • Highest education level: Not on file   Tobacco Use   • Smoking status: Former Smoker     Packs/day: 1.00     Years: 30.00     Pack years: 30.00     Types: Cigarettes     Last attempt to quit: 1997     Years since quittin.4   • Smokeless tobacco: Never Used   Substance and Sexual Activity   • Alcohol use: No     Frequency: Never     Comment: rarely   • Drug use: No   • Sexual activity: Defer           The following portions of the patient's history were reviewed and updated as appropriate: problem list, allergies, current medications, past medical history, past family history, past social history and past surgical history.    Review of Systems   Respiratory: Positive for shortness of breath.    Cardiovascular: Negative for chest pain.     Advance Care Planning   ACP discussion was held with the patient during this visit. Patient has an advance directive (not in EMR), copy requested.    Immunization History   Administered Date(s) Administered   • FLUAD TRI 65YR+ 10/08/2019   • Fluzone High Dose =>65 Years ([employer] ONLY) 10/08/2018   • Hepatitis A 10/01/2018, 2019   • Pneumococcal Conjugate 13-Valent (PCV13) 10/03/2016   • Pneumococcal Polysaccharide (PPSV23) 10/28/2014   • Tdap 10/14/2015       Objective   Vitals:    20 1409   BP: 144/80   Weight: 120 kg (265 lb)   Height: 172.7 cm (68\")     Body mass index is 40.29 kg/m².  Physical Exam   Constitutional: He appears well-developed and well-nourished.   HENT:   Head: Normocephalic and atraumatic.   Cardiovascular: Normal rate, " regular rhythm, S1 normal, S2 normal and normal heart sounds.   No edema   Pulmonary/Chest: Effort normal and breath sounds normal.   Neurological: He is alert.   Skin: Skin is warm.   Psychiatric: He has a normal mood and affect.   Vitals reviewed.      Procedures    Assessment/Plan   Sivlino was seen today for hyperlipidemia and hypertension.    Diagnoses and all orders for this visit:    Essential hypertension  -     lisinopril (PRINIVIL,ZESTRIL) 10 MG tablet; Take 1 tablet by mouth Daily.  -     Basic Metabolic Panel; Future    Temporal arteritis (CMS/HCC)    ERICK (obstructive sleep apnea)    Morbidly obese (CMS/HCC)               His pain from GCA is much improved with Prednisone; get note from Dr. Price. His BP is elevated so I am adding Lisinopril to help with his BP and ACE-I. He uses his CPAP nightly. Discussed decreasing portion sizes. Reviewed CT brain with him that shows old stroke.  Return in about 6 weeks (around 7/13/2020), or 30 minutes.   able to follow single-step instructions/unable to follow multi-step instructions/speech unintelligible

## 2020-06-18 ENCOUNTER — RESULTS ENCOUNTER (OUTPATIENT)
Dept: INTERNAL MEDICINE | Facility: CLINIC | Age: 70
End: 2020-06-18

## 2020-06-18 DIAGNOSIS — I10 ESSENTIAL HYPERTENSION: ICD-10-CM

## 2020-07-17 ENCOUNTER — OFFICE VISIT (OUTPATIENT)
Dept: INTERNAL MEDICINE | Facility: CLINIC | Age: 70
End: 2020-07-17

## 2020-07-17 VITALS
DIASTOLIC BLOOD PRESSURE: 90 MMHG | TEMPERATURE: 97.3 F | BODY MASS INDEX: 41.56 KG/M2 | SYSTOLIC BLOOD PRESSURE: 142 MMHG | HEIGHT: 68 IN | WEIGHT: 274.2 LBS

## 2020-07-17 DIAGNOSIS — I10 ESSENTIAL HYPERTENSION: Primary | ICD-10-CM

## 2020-07-17 DIAGNOSIS — Z13.6 SCREENING FOR AAA (ABDOMINAL AORTIC ANEURYSM): ICD-10-CM

## 2020-07-17 DIAGNOSIS — E78.49 OTHER HYPERLIPIDEMIA: ICD-10-CM

## 2020-07-17 DIAGNOSIS — E66.01 MORBIDLY OBESE (HCC): ICD-10-CM

## 2020-07-17 DIAGNOSIS — I25.10 CORONARY ARTERY DISEASE INVOLVING NATIVE CORONARY ARTERY OF NATIVE HEART WITHOUT ANGINA PECTORIS: ICD-10-CM

## 2020-07-17 PROCEDURE — 99214 OFFICE O/P EST MOD 30 MIN: CPT | Performed by: INTERNAL MEDICINE

## 2020-07-17 NOTE — PROGRESS NOTES
Subjective        Chief Complaint   Patient presents with   • Hypertension           Silvino Hidalgo Jr. is a 70 y.o. male who presents for    Patient Active Problem List   Diagnosis   • ERICK (obstructive sleep apnea)   • Edema   • H/O total shoulder replacement   • Hyperlipidemia   • Ischemic cardiomyopathy   • Morbidly obese (CMS/[facility])   • PFO (patent foramen ovale)   • RBBB (right bundle branch block)   • Rotator cuff arthropathy   • S/P CABG (coronary artery bypass graft)   • Stroke (CMS/[facility])   • Trifascicular bundle branch block   • VT (ventricular tachycardia) (CMS/[facility])   • CAD (coronary artery disease)   • Hypertension   • Prostate cancer (CMS/[facility])   • Temporal arteritis (CMS/[facility])       History of Present Illness     He has had some left heel pain that is better with cushioned shoes. His BP was 130/70 at Dr. Loja's office yesterday. His numbers at home are 120/70. He denies chest pain or dyspnea. Dr. Loja increased his Lasix two weeks ago for the edema at his ankles. He has not been taking allopurinol for some reason. He says his BP dropped a lot with Lisinopril.  No Known Allergies    Current Outpatient Medications on File Prior to Visit   Medication Sig Dispense Refill   • albuterol (PROVENTIL) (2.5 MG/3ML) 0.083% nebulizer solution Take 2.5 mg by nebulization Every 4 (Four) Hours As Needed for Wheezing. 30 vial 1   • aspirin 81 MG chewable tablet Chew 81 mg Daily.     • atorvastatin (LIPITOR) 10 MG tablet Take 10 mg by mouth Daily.     • carvedilol (COREG) 25 MG tablet Take 25 mg by mouth 2 (Two) Times a Day.     • docusate sodium (COLACE) 100 MG capsule Take 100 mg by mouth As Needed.     • esomeprazole (nexIUM) 40 MG capsule Take 40 mg by mouth Daily.     • flurbiprofen (OCUFEN) 0.03 % ophthalmic solution Administer 0.3 drops to both eyes As Needed.     • fluticasone (VERAMYST) 27.5 MCG/SPRAY nasal spray 2 sprays into the nostril(s) as directed by provider Daily.     • furosemide (LASIX) 40 MG tablet  TAKE ONE TABLET BY MOUTH TWICE A DAY (Patient taking differently: Daily.) 180 tablet 2   • nitroglycerin (NITROSTAT) 0.4 MG SL tablet Take 0.4 mg by mouth As Needed.     • potassium chloride (K-DUR) 10 MEQ CR tablet TAKE ONE TABLET BY MOUTH TWICE A DAY (Patient taking differently: 10 mEq.) 180 tablet 2   • predniSONE (DELTASONE) 10 MG tablet Patient took 4 10's for 3 weeks  3 10's for 2 weeks  2.5 tablets for two weeks June 4th patient will go to   2 tablets for 2 weeks     • [DISCONTINUED] isosorbide mononitrate (IMDUR) 30 MG 24 hr tablet Take 30 mg by mouth Daily.     • [DISCONTINUED] lisinopril (PRINIVIL,ZESTRIL) 10 MG tablet Take 1 tablet by mouth Daily. 30 tablet 3   • allopurinol (ZYLOPRIM) 300 MG tablet Take 300 mg by mouth Daily.     • colchicine 0.6 MG tablet Take 0.6 mg by mouth Daily.       Current Facility-Administered Medications on File Prior to Visit   Medication Dose Route Frequency Provider Last Rate Last Dose   • ipratropium-albuterol (DUO-NEB) nebulizer solution 3 mL  3 mL Nebulization 4x Daily - RT Mary Avendaño PA-C       • ipratropium-albuterol (DUO-NEB) nebulizer solution 3 mL  3 mL Nebulization 4x Daily - RT Mary Avendaño PA-C           Past Medical History:   Diagnosis Date   • Allergic    • Constipation    • Diastasis recti    • Flushing    • Gout    • Hyperlipidemia    • Hypertension    • Myocardial infarction (CMS/HCC)    • Obesity    • ERICK (obstructive sleep apnea)    • PFO (patent foramen ovale)    • Pneumonia    • Prostate cancer (CMS/HCC)     s/p XRT   • TIA (transient ischemic attack)    • Tubular adenoma of colon        Past Surgical History:   Procedure Laterality Date   • CARDIAC CATHETERIZATION     • CARDIAC DEFIBRILLATOR PLACEMENT  07/22/2019   • CATARACT EXTRACTION     • COLONOSCOPY  2018   • CORONARY ARTERY BYPASS GRAFT     • HEMORRHOIDECTOMY     • HERNIA REPAIR     • PACEMAKER IMPLANTATION     • REPLACEMENT TOTAL KNEE Bilateral    • ROTATOR CUFF REPAIR Right    • SHOULDER  "SURGERY Left     reversal       Family History   Problem Relation Age of Onset   • Alcohol abuse Mother    • Arthritis Mother    • Colon cancer Mother    • Rectal cancer Mother    • Alcohol abuse Father    • Arthritis Father    • Lung cancer Father        Social History     Socioeconomic History   • Marital status:      Spouse name: Not on file   • Number of children: Not on file   • Years of education: Not on file   • Highest education level: Not on file   Tobacco Use   • Smoking status: Former Smoker     Packs/day: 1.00     Years: 30.00     Pack years: 30.00     Types: Cigarettes     Last attempt to quit: 1997     Years since quittin.5   • Smokeless tobacco: Never Used   Substance and Sexual Activity   • Alcohol use: No     Frequency: Never     Comment: rarely   • Drug use: No   • Sexual activity: Defer           The following portions of the patient's history were reviewed and updated as appropriate: problem list, allergies, current medications, past medical history, past family history, past social history and past surgical history.    Review of Systems   Respiratory: Negative for shortness of breath.    Cardiovascular: Negative for chest pain.       Immunization History   Administered Date(s) Administered   • FLUAD TRI 65YR+ 10/08/2019   • Fluzone High Dose =>65 Years ([employer] ONLY) 10/08/2018   • Hepatitis A 10/01/2018, 2019   • Pneumococcal Conjugate 13-Valent (PCV13) 10/03/2016   • Pneumococcal Polysaccharide (PPSV23) 10/28/2014   • Tdap 10/14/2015       Objective   Vitals:    20 1338   BP: 142/90   Temp: 97.3 °F (36.3 °C)   Weight: 124 kg (274 lb 3.2 oz)   Height: 172.7 cm (68\")     Body mass index is 41.69 kg/m².  Physical Exam   Constitutional: He appears well-developed and well-nourished.   HENT:   Head: Normocephalic and atraumatic.   Cardiovascular: Normal rate, regular rhythm, S1 normal, S2 normal and normal heart sounds.   Pulmonary/Chest: Effort normal and breath sounds " normal.   Musculoskeletal:   Left heel non tender   Neurological: He is alert.   Skin: Skin is warm.   Psychiatric: He has a normal mood and affect.   Vitals reviewed.  Advance Care Planning   ACP discussion was held with the patient during this visit. Patient has an advance directive (not in EMR), copy requested.      Procedures    Assessment/Plan   Silvino was seen today for hypertension.    Diagnoses and all orders for this visit:    Essential hypertension    Other hyperlipidemia  -     Lipid Panel With / Chol / HDL Ratio; Future    Morbidly obese (CMS/HCC)    Coronary artery disease involving native coronary artery of native heart without angina pectoris  -     Comprehensive Metabolic Panel; Future    Screening for AAA (abdominal aortic aneurysm)  -     US AAA Screen Limited; Future               Get cscope from 2018. Edema is better with increased lasix. Get last note Dr. Loja. He does not want any more BP meds; it is lower at home. Recc wt loss. He may be getting over plantar fasciitis.  Return in about 4 months (around 11/17/2020), or 30 minutes.

## 2020-07-23 ENCOUNTER — TELEPHONE (OUTPATIENT)
Dept: INTERNAL MEDICINE | Facility: CLINIC | Age: 70
End: 2020-07-23

## 2020-07-23 NOTE — TELEPHONE ENCOUNTER
PATIENT WIFE DILCIA EARL STATED PATIENT HAS ULTRASOUND SCHEDULED AT 8AM    PATIENT MENTIONED TO HER THAT HE MAY HAVE ABDOMINAL AORTIC ANEURYSM AND SHE WOULD LIKE TO KNOW WHY HE IS BEING TESTED FOR THIS? WHAT MAKES DR THINK HE HAS THIS?    SHE ISNT ABLE TO COME TO APPOINTMENTS DUE TO COVID AND WOULD LIKE TO DISCUSS PLEASE    VERBAL CONSENT IN CHART    [phone] -OR-  [phone]

## 2020-07-23 NOTE — TELEPHONE ENCOUNTER
All men over 65 who ever smoked should be checked for aneurysm and especially with his heart disease

## 2020-07-24 ENCOUNTER — HOSPITAL ENCOUNTER (OUTPATIENT)
Dept: ULTRASOUND IMAGING | Facility: HOSPITAL | Age: 70
Discharge: HOME | End: 2020-07-24
Admitting: INTERNAL MEDICINE

## 2020-07-24 DIAGNOSIS — Z13.6 SCREENING FOR AAA (ABDOMINAL AORTIC ANEURYSM): ICD-10-CM

## 2020-07-24 PROCEDURE — 76706 US ABDL AORTA SCREEN AAA: CPT

## 2020-08-24 ENCOUNTER — TELEPHONE (OUTPATIENT)
Dept: INTERNAL MEDICINE | Facility: CLINIC | Age: 70
End: 2020-08-24

## 2020-08-24 NOTE — TELEPHONE ENCOUNTER
The number that was given is not in service. I called the cell on file and lm for him to call back

## 2020-08-24 NOTE — TELEPHONE ENCOUNTER
Patient see's Dr. Price and he was told to do labs in or office, however the patient had a CMP done on 8/21/20 with Dr. Price do they still need to have another CMP , please call [phone].

## 2020-08-24 NOTE — TELEPHONE ENCOUNTER
He needs the cmp and lipid in November that I ordered. Dr. Price needs to give him an actual lab order for labs if he wants him to have something in particular

## 2020-08-25 ENCOUNTER — TRANSCRIBE ORDERS (OUTPATIENT)
Dept: LAB | Facility: HOSPITAL | Age: 70
End: 2020-08-25

## 2020-08-25 ENCOUNTER — LAB (OUTPATIENT)
Dept: LAB | Facility: HOSPITAL | Age: 70
End: 2020-08-25

## 2020-08-25 DIAGNOSIS — M31.5 GIANT CELL ARTERITIS WITH POLYMYALGIA RHEUMATICA (HCC): ICD-10-CM

## 2020-08-25 DIAGNOSIS — M31.5 GIANT CELL ARTERITIS WITH POLYMYALGIA RHEUMATICA (HCC): Primary | ICD-10-CM

## 2020-08-25 LAB
ALBUMIN SERPL-MCNC: 4 G/DL (ref 3.5–5.2)
ALBUMIN/GLOB SERPL: 1.4 G/DL
ALP SERPL-CCNC: 74 U/L (ref 39–117)
ALT SERPL W P-5'-P-CCNC: 23 U/L (ref 1–41)
ANION GAP SERPL CALCULATED.3IONS-SCNC: 16 MMOL/L (ref 5–15)
AST SERPL-CCNC: 15 U/L (ref 1–40)
BASOPHILS # BLD AUTO: 0.05 10*3/MM3 (ref 0–0.2)
BASOPHILS NFR BLD AUTO: 0.5 % (ref 0–1.5)
BILIRUB SERPL-MCNC: 0.6 MG/DL (ref 0–1.2)
BUN SERPL-MCNC: 24 MG/DL (ref 8–23)
BUN/CREAT SERPL: 17 (ref 7–25)
CALCIUM SPEC-SCNC: 9.6 MG/DL (ref 8.6–10.5)
CHLORIDE SERPL-SCNC: 97 MMOL/L (ref 98–107)
CO2 SERPL-SCNC: 28 MMOL/L (ref 22–29)
CREAT SERPL-MCNC: 1.41 MG/DL (ref 0.76–1.27)
CRP SERPL-MCNC: 0.72 MG/DL (ref 0–0.5)
DEPRECATED RDW RBC AUTO: 44.8 FL (ref 37–54)
EOSINOPHIL # BLD AUTO: 0.08 10*3/MM3 (ref 0–0.4)
EOSINOPHIL NFR BLD AUTO: 0.9 % (ref 0.3–6.2)
ERYTHROCYTE [DISTWIDTH] IN BLOOD BY AUTOMATED COUNT: 12.1 % (ref 12.3–15.4)
GFR SERPL CREATININE-BSD FRML MDRD: 49 ML/MIN/1.73
GLOBULIN UR ELPH-MCNC: 2.9 GM/DL
GLUCOSE SERPL-MCNC: 144 MG/DL (ref 65–99)
HCT VFR BLD AUTO: 45.7 % (ref 37.5–51)
HGB BLD-MCNC: 15.2 G/DL (ref 13–17.7)
IMM GRANULOCYTES # BLD AUTO: 0.11 10*3/MM3 (ref 0–0.05)
IMM GRANULOCYTES NFR BLD AUTO: 1.2 % (ref 0–0.5)
LYMPHOCYTES # BLD AUTO: 3.15 10*3/MM3 (ref 0.7–3.1)
LYMPHOCYTES NFR BLD AUTO: 33.7 % (ref 19.6–45.3)
MCH RBC QN AUTO: 33.1 PG (ref 26.6–33)
MCHC RBC AUTO-ENTMCNC: 33.3 G/DL (ref 31.5–35.7)
MCV RBC AUTO: 99.6 FL (ref 79–97)
MONOCYTES # BLD AUTO: 1.18 10*3/MM3 (ref 0.1–0.9)
MONOCYTES NFR BLD AUTO: 12.6 % (ref 5–12)
NEUTROPHILS NFR BLD AUTO: 4.77 10*3/MM3 (ref 1.7–7)
NEUTROPHILS NFR BLD AUTO: 51.1 % (ref 42.7–76)
NRBC BLD AUTO-RTO: 0 /100 WBC (ref 0–0.2)
PLATELET # BLD AUTO: 229 10*3/MM3 (ref 140–450)
PMV BLD AUTO: 9.1 FL (ref 6–12)
POTASSIUM SERPL-SCNC: 3.3 MMOL/L (ref 3.5–5.2)
PROT SERPL-MCNC: 6.9 G/DL (ref 6–8.5)
RBC # BLD AUTO: 4.59 10*6/MM3 (ref 4.14–5.8)
SODIUM SERPL-SCNC: 141 MMOL/L (ref 136–145)
WBC # BLD AUTO: 9.34 10*3/MM3 (ref 3.4–10.8)

## 2020-08-25 PROCEDURE — 80053 COMPREHEN METABOLIC PANEL: CPT

## 2020-08-25 PROCEDURE — 36415 COLL VENOUS BLD VENIPUNCTURE: CPT

## 2020-08-25 PROCEDURE — 85025 COMPLETE CBC W/AUTO DIFF WBC: CPT

## 2020-08-25 PROCEDURE — 86140 C-REACTIVE PROTEIN: CPT

## 2020-09-18 ENCOUNTER — TRANSCRIBE ORDERS (OUTPATIENT)
Dept: LAB | Facility: HOSPITAL | Age: 70
End: 2020-09-18

## 2020-09-18 ENCOUNTER — LAB (OUTPATIENT)
Dept: LAB | Facility: HOSPITAL | Age: 70
End: 2020-09-18

## 2020-09-18 DIAGNOSIS — M31.6 GCA (GIANT CELL ARTERITIS) (HCC): Primary | ICD-10-CM

## 2020-09-18 DIAGNOSIS — M31.6 GCA (GIANT CELL ARTERITIS) (HCC): ICD-10-CM

## 2020-09-18 LAB
CRP SERPL-MCNC: 0.62 MG/DL (ref 0–0.5)
ERYTHROCYTE [SEDIMENTATION RATE] IN BLOOD: 50 MM/HR (ref 0–20)

## 2020-09-18 PROCEDURE — 86140 C-REACTIVE PROTEIN: CPT

## 2020-09-18 PROCEDURE — 36415 COLL VENOUS BLD VENIPUNCTURE: CPT

## 2020-09-18 PROCEDURE — 85652 RBC SED RATE AUTOMATED: CPT

## 2020-09-21 DIAGNOSIS — M31.6 TEMPORAL ARTERITIS (HCC): Primary | ICD-10-CM

## 2020-09-22 ENCOUNTER — TELEPHONE (OUTPATIENT)
Dept: INTERNAL MEDICINE | Facility: CLINIC | Age: 70
End: 2020-09-22

## 2020-09-22 ENCOUNTER — CLINICAL SUPPORT (OUTPATIENT)
Dept: INTERNAL MEDICINE | Facility: CLINIC | Age: 70
End: 2020-09-22

## 2020-09-22 DIAGNOSIS — Z23 NEED FOR INFLUENZA VACCINATION: Primary | ICD-10-CM

## 2020-09-22 PROCEDURE — 90694 VACC AIIV4 NO PRSRV 0.5ML IM: CPT | Performed by: INTERNAL MEDICINE

## 2020-09-22 PROCEDURE — G0008 ADMIN INFLUENZA VIRUS VAC: HCPCS | Performed by: INTERNAL MEDICINE

## 2020-09-22 NOTE — TELEPHONE ENCOUNTER
Patient came in for flu shot and he wants to know if you can take over his arthritis instead of him having to go to another physician.

## 2020-09-22 NOTE — TELEPHONE ENCOUNTER
Patient called to schedule a flu shot. Attempted to warm transfer and was unsuccessful.     Please advise     [phone]

## 2020-11-11 LAB
BUN SERPL-MCNC: 29 MG/DL (ref 8–27)
BUN/CREAT SERPL: 23 (ref 10–24)
CALCIUM SERPL-MCNC: 9.7 MG/DL (ref 8.6–10.2)
CHLORIDE SERPL-SCNC: 99 MMOL/L (ref 96–106)
CO2 SERPL-SCNC: 25 MMOL/L (ref 20–29)
CREAT SERPL-MCNC: 1.26 MG/DL (ref 0.76–1.27)
GLUCOSE SERPL-MCNC: 115 MG/DL (ref 65–99)
Lab: NORMAL
POTASSIUM SERPL-SCNC: 3.1 MMOL/L (ref 3.5–5.2)
SODIUM SERPL-SCNC: 142 MMOL/L (ref 134–144)

## 2020-11-16 ENCOUNTER — RESULTS ENCOUNTER (OUTPATIENT)
Dept: INTERNAL MEDICINE | Facility: CLINIC | Age: 70
End: 2020-11-16

## 2020-11-16 DIAGNOSIS — E78.49 OTHER HYPERLIPIDEMIA: ICD-10-CM

## 2020-11-16 DIAGNOSIS — I25.10 CORONARY ARTERY DISEASE INVOLVING NATIVE CORONARY ARTERY OF NATIVE HEART WITHOUT ANGINA PECTORIS: ICD-10-CM

## 2020-11-17 ENCOUNTER — OFFICE VISIT (OUTPATIENT)
Dept: INTERNAL MEDICINE | Facility: CLINIC | Age: 70
End: 2020-11-17

## 2020-11-17 VITALS
WEIGHT: 275 LBS | DIASTOLIC BLOOD PRESSURE: 80 MMHG | BODY MASS INDEX: 41.68 KG/M2 | TEMPERATURE: 98 F | SYSTOLIC BLOOD PRESSURE: 116 MMHG | HEIGHT: 68 IN

## 2020-11-17 DIAGNOSIS — E78.49 OTHER HYPERLIPIDEMIA: Primary | ICD-10-CM

## 2020-11-17 DIAGNOSIS — I10 ESSENTIAL HYPERTENSION: ICD-10-CM

## 2020-11-17 PROCEDURE — 99213 OFFICE O/P EST LOW 20 MIN: CPT | Performed by: INTERNAL MEDICINE

## 2020-11-17 RX ORDER — POTASSIUM CHLORIDE 20 MEQ/1
20 TABLET, EXTENDED RELEASE ORAL 3 TIMES DAILY
COMMUNITY
End: 2022-05-27

## 2020-11-17 RX ORDER — ASPIRIN 325 MG
325 TABLET ORAL DAILY
COMMUNITY
End: 2021-05-17

## 2020-11-17 RX ORDER — METOLAZONE 2.5 MG/1
2.5 TABLET ORAL DAILY
COMMUNITY
End: 2022-05-27

## 2020-11-17 RX ORDER — POTASSIUM CHLORIDE 20 MEQ/1
20 TABLET, EXTENDED RELEASE ORAL 2 TIMES DAILY
COMMUNITY
End: 2020-11-17

## 2020-11-17 RX ORDER — CARVEDILOL 25 MG/1
25 TABLET ORAL 2 TIMES DAILY WITH MEALS
COMMUNITY

## 2020-11-17 NOTE — PROGRESS NOTES
Subjective        Chief Complaint   Patient presents with   • Hypertension           Silvino Hidalgo Jr. is a 70 y.o. male who presents for    Patient Active Problem List   Diagnosis   • ERICK (obstructive sleep apnea)   • Edema   • H/O total shoulder replacement   • Other hyperlipidemia   • Ischemic cardiomyopathy   • Morbidly obese (CMS/[facility])   • PFO (patent foramen ovale)   • RBBB (right bundle branch block)   • Rotator cuff arthropathy   • S/P CABG (coronary artery bypass graft)   • Stroke (CMS/[facility])   • Trifascicular bundle branch block   • VT (ventricular tachycardia) (CMS/[facility])   • CAD (coronary artery disease)   • Essential hypertension   • Prostate cancer (CMS/[facility])   • Temporal arteritis (CMS/[facility])       History of Present Illness     He saw Dr. Jeffers who does not think he has GCA. He saw her two weeks ago. He checked his BP last night and it was 116/60. He uses his CPAP nightly. He was told by his cards that he has heart failure and an erratic heart beat. His edema has decreased in his ankles.  No Known Allergies    Current Outpatient Medications on File Prior to Visit   Medication Sig Dispense Refill   • albuterol (PROVENTIL) (2.5 MG/3ML) 0.083% nebulizer solution Take 2.5 mg by nebulization Every 4 (Four) Hours As Needed for Wheezing. 30 vial 1   • aspirin 325 MG tablet Take 325 mg by mouth Daily.     • atorvastatin (LIPITOR) 10 MG tablet Take 10 mg by mouth Daily.     • carvedilol (COREG) 25 MG tablet Take 25 mg by mouth 2 (Two) Times a Day With Meals.     • flurbiprofen (OCUFEN) 0.03 % ophthalmic solution Administer 0.3 drops to both eyes As Needed.     • furosemide (LASIX) 40 MG tablet TAKE ONE TABLET BY MOUTH TWICE A DAY (Patient taking differently: Daily.) 180 tablet 2   • metOLazone (ZAROXOLYN) 2.5 MG tablet Take 2.5 mg by mouth Daily.     • Mirabegron ER (Myrbetriq) 50 MG tablet sustained-release 24 hour 24 hr tablet Take 50 mg by mouth Daily.     • NITROGLYCERIN PATCH 0.2 mg. 1patch  For 12 hours a day      • potassium chloride (K-DUR,KLOR-CON) 20 MEQ CR tablet Take 20 mEq by mouth 3 (Three) Times a Day.     • [DISCONTINUED] aspirin 81 MG chewable tablet Chew 81 mg Daily.     • [DISCONTINUED] potassium chloride (K-DUR,KLOR-CON) 20 MEQ CR tablet Take 20 mEq by mouth 2 (Two) Times a Day.     • [DISCONTINUED] allopurinol (ZYLOPRIM) 300 MG tablet Take 300 mg by mouth Daily.     • [DISCONTINUED] colchicine 0.6 MG tablet Take 0.6 mg by mouth Daily.     • [DISCONTINUED] docusate sodium (COLACE) 100 MG capsule Take 100 mg by mouth As Needed.     • [DISCONTINUED] esomeprazole (nexIUM) 40 MG capsule Take 40 mg by mouth Daily.     • [DISCONTINUED] fluticasone (VERAMYST) 27.5 MCG/SPRAY nasal spray 2 sprays into the nostril(s) as directed by provider Daily.     • [DISCONTINUED] nitroglycerin (NITROSTAT) 0.4 MG SL tablet Take 0.4 mg by mouth As Needed.     • [DISCONTINUED] potassium chloride (K-DUR) 10 MEQ CR tablet TAKE ONE TABLET BY MOUTH TWICE A DAY (Patient taking differently: 10 mEq.) 180 tablet 2   • [DISCONTINUED] predniSONE (DELTASONE) 10 MG tablet Patient took 4 10's for 3 weeks  3 10's for 2 weeks  2.5 tablets for two weeks June 4th patient will go to   2 tablets for 2 weeks       Current Facility-Administered Medications on File Prior to Visit   Medication Dose Route Frequency Provider Last Rate Last Dose   • ipratropium-albuterol (DUO-NEB) nebulizer solution 3 mL  3 mL Nebulization 4x Daily - RT Mary Avendaño PA-C       • ipratropium-albuterol (DUO-NEB) nebulizer solution 3 mL  3 mL Nebulization 4x Daily - RT Mary Avendaño PA-C           Past Medical History:   Diagnosis Date   • Allergic    • Constipation    • Diastasis recti    • Flushing    • Gout    • Myocardial infarction (CMS/HCC)    • ERICK (obstructive sleep apnea)    • PFO (patent foramen ovale)    • Pneumonia    • TIA (transient ischemic attack)    • Tubular adenoma of colon        Past Surgical History:   Procedure Laterality Date   • CARDIAC CATHETERIZATION  "    • CARDIAC DEFIBRILLATOR PLACEMENT  2019   • CATARACT EXTRACTION     • COLONOSCOPY  2018    repeat 5 years Dr. Daniel   • CORONARY ARTERY BYPASS GRAFT     • HEMORRHOIDECTOMY     • HERNIA REPAIR     • PACEMAKER IMPLANTATION     • REPLACEMENT TOTAL KNEE Bilateral    • ROTATOR CUFF REPAIR Right    • SHOULDER SURGERY Left     reversal       Family History   Problem Relation Age of Onset   • Alcohol abuse Mother    • Arthritis Mother    • Colon cancer Mother    • Rectal cancer Mother    • Alcohol abuse Father    • Arthritis Father    • Lung cancer Father        Social History     Socioeconomic History   • Marital status:      Spouse name: Not on file   • Number of children: Not on file   • Years of education: Not on file   • Highest education level: Not on file   Tobacco Use   • Smoking status: Former Smoker     Packs/day: 1.00     Years: 30.00     Pack years: 30.00     Types: Cigarettes     Quit date: 1997     Years since quittin.8   • Smokeless tobacco: Never Used   Substance and Sexual Activity   • Alcohol use: No     Frequency: Never     Comment: rarely   • Drug use: No   • Sexual activity: Defer           The following portions of the patient's history were reviewed and updated as appropriate: problem list, allergies, current medications, past medical history, past family history, past social history and past surgical history.    Review of Systems   Cardiovascular: Negative for leg swelling.       Immunization History   Administered Date(s) Administered   • FLUAD TRI 65YR+ 10/08/2019   • Fluad Quad 65+ 2020   • Fluzone High Dose =>65 Years ([employer] ONLY) 10/08/2018   • Hepatitis A 10/01/2018, 2019   • Pneumococcal Conjugate 13-Valent (PCV13) 10/03/2016   • Pneumococcal Polysaccharide (PPSV23) 10/28/2014   • Tdap 10/14/2015       Objective   Vitals:    20 1124   BP: 116/80   Temp: 98 °F (36.7 °C)   Weight: 125 kg (275 lb)   Height: 172.7 cm (68\")     Body mass index is " 41.81 kg/m².  Physical Exam  Vitals signs reviewed.   Constitutional:       Appearance: He is well-developed.   HENT:      Head: Normocephalic and atraumatic.   Cardiovascular:      Rate and Rhythm: Normal rate and regular rhythm.      Heart sounds: Normal heart sounds, S1 normal and S2 normal.      Comments: No edema on exam  Pulmonary:      Effort: Pulmonary effort is normal.      Breath sounds: Normal breath sounds.   Skin:     General: Skin is warm.   Neurological:      Mental Status: He is alert.   Psychiatric:         Behavior: Behavior normal.         Procedures    Assessment/Plan   Diagnoses and all orders for this visit:    1. Other hyperlipidemia (Primary)  -     Lipid Panel With / Chol / HDL Ratio; Future    2. Essential hypertension  -     Comprehensive Metabolic Panel; Future               BP is good. Labs in the next week. Discussed Shingrix.  Return in about 6 months (around 5/17/2021) for Medicare Wellness.

## 2020-11-18 ENCOUNTER — RESULTS ENCOUNTER (OUTPATIENT)
Dept: INTERNAL MEDICINE | Facility: CLINIC | Age: 70
End: 2020-11-18

## 2020-11-18 DIAGNOSIS — I10 ESSENTIAL HYPERTENSION: ICD-10-CM

## 2020-11-18 DIAGNOSIS — E78.49 OTHER HYPERLIPIDEMIA: ICD-10-CM

## 2020-11-25 DIAGNOSIS — E78.49 OTHER HYPERLIPIDEMIA: Primary | ICD-10-CM

## 2020-11-25 LAB
ALBUMIN SERPL-MCNC: 3.9 G/DL (ref 3.7–4.7)
ALBUMIN/GLOB SERPL: 1.6 {RATIO} (ref 1.2–2.2)
ALP SERPL-CCNC: 74 IU/L (ref 39–117)
ALT SERPL-CCNC: 10 IU/L (ref 0–44)
AST SERPL-CCNC: 16 IU/L (ref 0–40)
BILIRUB SERPL-MCNC: 0.3 MG/DL (ref 0–1.2)
BUN SERPL-MCNC: 24 MG/DL (ref 8–27)
BUN/CREAT SERPL: 20 (ref 10–24)
CALCIUM SERPL-MCNC: 9.6 MG/DL (ref 8.6–10.2)
CHLORIDE SERPL-SCNC: 99 MMOL/L (ref 96–106)
CHOLEST SERPL-MCNC: 133 MG/DL (ref 100–199)
CHOLEST/HDLC SERPL: 4.4 RATIO (ref 0–5)
CO2 SERPL-SCNC: 27 MMOL/L (ref 20–29)
CREAT SERPL-MCNC: 1.23 MG/DL (ref 0.76–1.27)
GLOBULIN SER CALC-MCNC: 2.5 G/DL (ref 1.5–4.5)
GLUCOSE SERPL-MCNC: 113 MG/DL (ref 65–99)
HDLC SERPL-MCNC: 30 MG/DL
LDLC SERPL CALC-MCNC: 72 MG/DL (ref 0–99)
POTASSIUM SERPL-SCNC: 3.5 MMOL/L (ref 3.5–5.2)
PROT SERPL-MCNC: 6.4 G/DL (ref 6–8.5)
SODIUM SERPL-SCNC: 145 MMOL/L (ref 134–144)
TRIGL SERPL-MCNC: 184 MG/DL (ref 0–149)
VLDLC SERPL CALC-MCNC: 31 MG/DL (ref 5–40)

## 2020-11-25 RX ORDER — ATORVASTATIN CALCIUM 20 MG/1
20 TABLET, FILM COATED ORAL DAILY
Qty: 30 TABLET | Refills: 3 | Status: SHIPPED | OUTPATIENT
Start: 2020-11-25 | End: 2021-02-26 | Stop reason: SDUPTHER

## 2021-02-15 DIAGNOSIS — E78.49 OTHER HYPERLIPIDEMIA: ICD-10-CM

## 2021-02-17 LAB
ALBUMIN SERPL-MCNC: 4.2 G/DL (ref 3.5–5.2)
ALBUMIN/GLOB SERPL: 1.4 G/DL
ALP SERPL-CCNC: 87 U/L (ref 39–117)
ALT SERPL-CCNC: 15 U/L (ref 1–41)
AST SERPL-CCNC: 19 U/L (ref 1–40)
BILIRUB SERPL-MCNC: 0.5 MG/DL (ref 0–1.2)
BUN SERPL-MCNC: 19 MG/DL (ref 8–23)
BUN/CREAT SERPL: 13.3 (ref 7–25)
CALCIUM SERPL-MCNC: 10.1 MG/DL (ref 8.6–10.5)
CHLORIDE SERPL-SCNC: 94 MMOL/L (ref 98–107)
CHOLEST SERPL-MCNC: 149 MG/DL (ref 0–200)
CHOLEST/HDLC SERPL: 4.14 {RATIO}
CO2 SERPL-SCNC: 33.4 MMOL/L (ref 22–29)
CREAT SERPL-MCNC: 1.43 MG/DL (ref 0.76–1.27)
GLOBULIN SER CALC-MCNC: 2.9 GM/DL
GLUCOSE SERPL-MCNC: 115 MG/DL (ref 65–99)
HDLC SERPL-MCNC: 36 MG/DL (ref 40–60)
LDLC SERPL CALC-MCNC: 81 MG/DL (ref 0–100)
POTASSIUM SERPL-SCNC: 3.5 MMOL/L (ref 3.5–5.2)
PROT SERPL-MCNC: 7.1 G/DL (ref 6–8.5)
SODIUM SERPL-SCNC: 142 MMOL/L (ref 136–145)
TRIGL SERPL-MCNC: 189 MG/DL (ref 0–150)
VLDLC SERPL CALC-MCNC: 32 MG/DL (ref 5–40)

## 2021-02-26 ENCOUNTER — OFFICE VISIT (OUTPATIENT)
Dept: INTERNAL MEDICINE | Facility: CLINIC | Age: 71
End: 2021-02-26

## 2021-02-26 VITALS
DIASTOLIC BLOOD PRESSURE: 80 MMHG | TEMPERATURE: 97.8 F | SYSTOLIC BLOOD PRESSURE: 110 MMHG | BODY MASS INDEX: 41.22 KG/M2 | WEIGHT: 272 LBS | HEIGHT: 68 IN

## 2021-02-26 DIAGNOSIS — I10 ESSENTIAL HYPERTENSION: Chronic | ICD-10-CM

## 2021-02-26 DIAGNOSIS — N28.9 RENAL INSUFFICIENCY: ICD-10-CM

## 2021-02-26 DIAGNOSIS — R73.9 HYPERGLYCEMIA: ICD-10-CM

## 2021-02-26 DIAGNOSIS — E78.49 OTHER HYPERLIPIDEMIA: Primary | Chronic | ICD-10-CM

## 2021-02-26 PROCEDURE — 99214 OFFICE O/P EST MOD 30 MIN: CPT | Performed by: INTERNAL MEDICINE

## 2021-02-26 RX ORDER — PREDNISONE 1 MG/1
1 TABLET ORAL DAILY
COMMUNITY
End: 2021-05-17

## 2021-02-26 RX ORDER — NITROGLYCERIN 80 MG/1
1 PATCH TRANSDERMAL DAILY
COMMUNITY

## 2021-02-26 RX ORDER — ATORVASTATIN CALCIUM 20 MG/1
20 TABLET, FILM COATED ORAL DAILY
Qty: 30 TABLET | Refills: 3 | Status: SHIPPED | OUTPATIENT
Start: 2021-02-26 | End: 2021-11-01 | Stop reason: SDUPTHER

## 2021-02-26 RX ORDER — FUROSEMIDE 40 MG/1
40 TABLET ORAL 2 TIMES DAILY
Qty: 180 TABLET | Refills: 2
Start: 2021-02-26 | End: 2022-05-27

## 2021-02-26 NOTE — PROGRESS NOTES
Subjective        Chief Complaint   Patient presents with   • Hypertension           Silvino Hidalgo Jr. is a 70 y.o. male who presents for    Patient Active Problem List   Diagnosis   • ERICK (obstructive sleep apnea)   • Edema   • H/O total shoulder replacement   • Other hyperlipidemia   • Ischemic cardiomyopathy   • Morbidly obese (CMS/[facility])   • PFO (patent foramen ovale)   • RBBB (right bundle branch block)   • Rotator cuff arthropathy   • S/P CABG (coronary artery bypass graft)   • Stroke (CMS/[facility])   • Trifascicular bundle branch block   • VT (ventricular tachycardia) (CMS/[facility])   • CAD (coronary artery disease)   • Essential hypertension   • Prostate cancer (CMS/[facility])   • Temporal arteritis (CMS/[facility])   • Renal insufficiency       History of Present Illness     He sees Dr. Ama Jeffers. He is in the process of tapering his prednisone. His hands have started to hurt. He has a tremor according to his wife for years. He does not exercise regularly.He took a nitro about 3 weeks for chest tightness and it helped immediately. He had a mild severity lateral defect with ischemia that represented only 4 percent of LV myocardial mass in August. He says he has been taking Lipitor 10 mg  Daily.  No Known Allergies    Current Outpatient Medications on File Prior to Visit   Medication Sig Dispense Refill   • albuterol (PROVENTIL) (2.5 MG/3ML) 0.083% nebulizer solution Take 2.5 mg by nebulization Every 4 (Four) Hours As Needed for Wheezing. 30 vial 1   • aspirin 325 MG tablet Take 325 mg by mouth Daily.     • carvedilol (COREG) 25 MG tablet Take 25 mg by mouth 2 (Two) Times a Day With Meals.     • flurbiprofen (OCUFEN) 0.03 % ophthalmic solution Administer 0.3 drops to both eyes As Needed.     • metOLazone (ZAROXOLYN) 2.5 MG tablet Take 2.5 mg by mouth Daily.     • Mirabegron ER (Myrbetriq) 50 MG tablet sustained-release 24 hour 24 hr tablet Take 50 mg by mouth Daily.     • nitroglycerin (NITRODUR) 0.4 MG/HR patch Place 1 patch on  the skin as directed by provider Daily.     • potassium chloride (K-DUR,KLOR-CON) 20 MEQ CR tablet Take 20 mEq by mouth 3 (Three) Times a Day.     • predniSONE (DELTASONE) 1 MG tablet Take 1 mg by mouth Daily.     • [DISCONTINUED] atorvastatin (LIPITOR) 20 MG tablet Take 1 tablet by mouth Daily. 30 tablet 3   • [DISCONTINUED] furosemide (LASIX) 40 MG tablet TAKE ONE TABLET BY MOUTH TWICE A DAY (Patient taking differently: Daily.) 180 tablet 2   • [DISCONTINUED] NITROGLYCERIN PATCH 0.2 mg. 1patch  For 12 hours a day       Current Facility-Administered Medications on File Prior to Visit   Medication Dose Route Frequency Provider Last Rate Last Admin   • ipratropium-albuterol (DUO-NEB) nebulizer solution 3 mL  3 mL Nebulization 4x Daily - RT Mary Avendaño PA-C       • ipratropium-albuterol (DUO-NEB) nebulizer solution 3 mL  3 mL Nebulization 4x Daily - RT Mary Avendaño PA-C           Past Medical History:   Diagnosis Date   • Allergic    • Constipation    • Diastasis recti    • Flushing    • Gout    • Myocardial infarction (CMS/HCC)    • ERICK (obstructive sleep apnea)    • PFO (patent foramen ovale)    • Pneumonia    • TIA (transient ischemic attack)    • Tubular adenoma of colon        Past Surgical History:   Procedure Laterality Date   • CARDIAC CATHETERIZATION     • CARDIAC DEFIBRILLATOR PLACEMENT  07/22/2019   • CATARACT EXTRACTION     • COLONOSCOPY  03/22/2018    repeat 5 years Dr. Daniel   • CORONARY ARTERY BYPASS GRAFT     • HEMORRHOIDECTOMY     • HERNIA REPAIR     • PACEMAKER IMPLANTATION     • REPLACEMENT TOTAL KNEE Bilateral    • ROTATOR CUFF REPAIR Right    • SHOULDER SURGERY Left     reversal       Family History   Problem Relation Age of Onset   • Alcohol abuse Mother    • Arthritis Mother    • Colon cancer Mother    • Rectal cancer Mother    • Alcohol abuse Father    • Arthritis Father    • Lung cancer Father        Social History     Socioeconomic History   • Marital status:      Spouse name:  "Not on file   • Number of children: Not on file   • Years of education: Not on file   • Highest education level: Not on file   Tobacco Use   • Smoking status: Former Smoker     Packs/day: 1.00     Years: 30.00     Pack years: 30.00     Types: Cigarettes     Quit date: 1997     Years since quittin.1   • Smokeless tobacco: Never Used   Substance and Sexual Activity   • Alcohol use: No     Frequency: Never     Comment: rarely   • Drug use: No   • Sexual activity: Defer           The following portions of the patient's history were reviewed and updated as appropriate: problem list, allergies, current medications, past medical history, past family history, past social history and past surgical history.    Review of Systems    Immunization History   Administered Date(s) Administered   • COVID-19 (PFIZER) 2021   • FLUAD TRI 65YR+ 10/08/2019   • Fluad Quad 65+ 2020   • Fluzone High Dose =>65 Years ([employer] ONLY) 10/08/2018   • Hepatitis A 10/01/2018, 2019   • Pneumococcal Conjugate 13-Valent (PCV13) 10/03/2016   • Pneumococcal Polysaccharide (PPSV23) 10/28/2014   • Tdap 10/14/2015       Objective   Vitals:    21 1259   BP: 110/80   Temp: 97.8 °F (36.6 °C)   Weight: 123 kg (272 lb)   Height: 172.7 cm (68\")     Body mass index is 41.36 kg/m².  Physical Exam  Vitals signs reviewed.   Constitutional:       Appearance: He is well-developed.   HENT:      Head: Normocephalic and atraumatic.   Cardiovascular:      Rate and Rhythm: Normal rate and regular rhythm.      Heart sounds: Normal heart sounds, S1 normal and S2 normal.      Comments: No edema    No tremor on exam  Pulmonary:      Effort: Pulmonary effort is normal.      Breath sounds: Normal breath sounds.   Skin:     General: Skin is warm.   Neurological:      Mental Status: He is alert.   Psychiatric:         Behavior: Behavior normal.         Procedures    Assessment/Plan   Diagnoses and all orders for this visit:    1. Other " hyperlipidemia (Primary)  -     atorvastatin (LIPITOR) 20 MG tablet; Take 1 tablet by mouth Daily.  Dispense: 30 tablet; Refill: 3  -     Lipid Panel With / Chol / HDL Ratio; Future    2. Essential hypertension    3. Renal insufficiency  -     Comprehensive Metabolic Panel; Future    4. Hyperglycemia  -     Hemoglobin A1c; Future    Other orders  -     furosemide (LASIX) 40 MG tablet; Take 1 tablet by mouth 2 (Two) Times a Day.  Dispense: 180 tablet; Refill: 2               Reviewed CMP and Lipids. Increase Lipitor from 10 mg to 20 mg daily. Aware no NSAIDS. He will let cards know next week that he used a SL nitro 3 weeks ago. BP is good and he has no edema. He is going to call Dr. Jeffers next week. Monitor his tremor as I do not see any today.  No follow-ups on file.

## 2021-05-17 ENCOUNTER — OFFICE VISIT (OUTPATIENT)
Dept: INTERNAL MEDICINE | Facility: CLINIC | Age: 71
End: 2021-05-17

## 2021-05-17 VITALS
DIASTOLIC BLOOD PRESSURE: 74 MMHG | TEMPERATURE: 97.4 F | HEIGHT: 68 IN | SYSTOLIC BLOOD PRESSURE: 122 MMHG | BODY MASS INDEX: 41.52 KG/M2 | WEIGHT: 274 LBS

## 2021-05-17 DIAGNOSIS — Z00.00 MEDICARE ANNUAL WELLNESS VISIT, SUBSEQUENT: Primary | ICD-10-CM

## 2021-05-17 DIAGNOSIS — I10 ESSENTIAL HYPERTENSION: Chronic | ICD-10-CM

## 2021-05-17 DIAGNOSIS — G25.81 RLS (RESTLESS LEGS SYNDROME): ICD-10-CM

## 2021-05-17 DIAGNOSIS — R73.03 PRE-DIABETES: ICD-10-CM

## 2021-05-17 DIAGNOSIS — M35.3 PMR (POLYMYALGIA RHEUMATICA) (HCC): ICD-10-CM

## 2021-05-17 DIAGNOSIS — I25.10 CORONARY ARTERY DISEASE INVOLVING NATIVE CORONARY ARTERY OF NATIVE HEART WITHOUT ANGINA PECTORIS: ICD-10-CM

## 2021-05-17 DIAGNOSIS — E78.49 OTHER HYPERLIPIDEMIA: Chronic | ICD-10-CM

## 2021-05-17 DIAGNOSIS — R79.9 ABNORMAL FINDING OF BLOOD CHEMISTRY, UNSPECIFIED: ICD-10-CM

## 2021-05-17 PROCEDURE — G0439 PPPS, SUBSEQ VISIT: HCPCS | Performed by: INTERNAL MEDICINE

## 2021-05-17 PROCEDURE — 99213 OFFICE O/P EST LOW 20 MIN: CPT | Performed by: INTERNAL MEDICINE

## 2021-05-17 RX ORDER — ALLOPURINOL 300 MG/1
500 TABLET ORAL
COMMUNITY
Start: 2021-04-27

## 2021-05-17 RX ORDER — PREDNISONE 1 MG/1
1 TABLET ORAL DAILY
COMMUNITY
End: 2022-02-22

## 2021-05-17 RX ORDER — COLCHICINE 0.6 MG/1
TABLET ORAL DAILY
COMMUNITY
Start: 2021-04-27

## 2021-05-17 RX ORDER — APIXABAN 5 MG/1
TABLET, FILM COATED ORAL EVERY 12 HOURS SCHEDULED
COMMUNITY
Start: 2021-03-04 | End: 2023-02-28

## 2021-05-17 NOTE — PROGRESS NOTES
The ABCs of the Annual Wellness Visit  Subsequent Medicare Wellness Visit    Chief Complaint   Patient presents with   • Medicare Wellness-subsequent       Subjective   History of Present Illness:  Silvino Hidalgo Jr. is a 71 y.o. male who presents for a Subsequent Medicare Wellness Visit.  He had a cystoscope this morning and he was told that it was normal. He last saw his cardiologist three months ago. He denies chest pain. His breathing is better than it was. He sees rheumatology next week in follow up. He is being treated for gout in his feet and he is able to walk better. His right hand is shaking. It is with activity. It does not keep him from holding anything. He has no FH of tremor. His legs jump at night. He has no recent moving his legs in bed c/w RLS. He thinks he had a diagnosis in the past. He is not having headaches.  HEALTH RISK ASSESSMENT    Recent Hospitalizations:  No hospitalization(s) within the last year.    Current Medical Providers:  Patient Care Team:  Juan Alberto Rutherford MD as PCP - General (Internal Medicine)    Smoking Status:  Social History     Tobacco Use   Smoking Status Former Smoker   • Packs/day: 1.00   • Years: 30.00   • Pack years: 30.00   • Types: Cigarettes   • Quit date: 1997   • Years since quittin.3   Smokeless Tobacco Never Used       Alcohol Consumption:  Social History     Substance and Sexual Activity   Alcohol Use No    Comment: rarely       Depression Screen:   PHQ-2/PHQ-9 Depression Screening 2021   Little interest or pleasure in doing things 0   Feeling down, depressed, or hopeless 0   Total Score 0       Fall Risk Screen:  STEADI Fall Risk Assessment was completed, and patient is at LOW risk for falls.Assessment completed on:2021    Health Habits and Functional and Cognitive Screening:  Functional & Cognitive Status 2021   Do you have difficulty preparing food and eating? No   Do you have difficulty bathing yourself, getting dressed or grooming  yourself? No   Do you have difficulty using the toilet? No   Do you have difficulty moving around from place to place? No   Do you have trouble with steps or getting out of a bed or a chair? No   Current Diet Well Balanced Diet   Dental Exam Up to date   Eye Exam Up to date   Exercise (times per week) 5 times per week   Current Exercise Activities Include Yard Work   Do you need help using the phone?  No   Are you deaf or do you have serious difficulty hearing?  No   Do you need help with transportation? No   Do you need help shopping? No   Do you need help preparing meals?  No   Do you need help with housework?  No   Do you need help with laundry? No   Do you need help taking your medications? No   Do you need help managing money? No   Do you ever drive or ride in a car without wearing a seat belt? No   Have you felt unusual stress, anger or loneliness in the last month? No   Who do you live with? Spouse   If you need help, do you have trouble finding someone available to you? No   Have you been bothered in the last four weeks by sexual problems? No         Does the patient have evidence of cognitive impairment? No    Asprin use counseling:Does not need ASA (and currently is not on it)    Age-appropriate Screening Schedule:  Refer to the list below for future screening recommendations based on patient's age, sex and/or medical conditions. Orders for these recommended tests are listed in the plan section. The patient has been provided with a written plan.    Health Maintenance   Topic Date Due   • ZOSTER VACCINE (1 of 2) Never done   • INFLUENZA VACCINE  08/01/2021   • LIPID PANEL  05/10/2022   • TDAP/TD VACCINES (2 - Td) 10/14/2025          The following portions of the patient's history were reviewed and updated as appropriate: allergies, current medications, past family history, past medical history, past social history, past surgical history and problem list.    Outpatient Medications Prior to Visit   Medication  Sig Dispense Refill   • albuterol (PROVENTIL) (2.5 MG/3ML) 0.083% nebulizer solution Take 2.5 mg by nebulization Every 4 (Four) Hours As Needed for Wheezing. 30 vial 1   • allopurinol (ZYLOPRIM) 300 MG tablet      • atorvastatin (LIPITOR) 20 MG tablet Take 1 tablet by mouth Daily. 30 tablet 3   • carvedilol (COREG) 25 MG tablet Take 25 mg by mouth 2 (Two) Times a Day With Meals.     • colchicine 0.6 MG tablet Takes 1/2 tab every other day     • Eliquis 5 MG tablet tablet Every 12 (Twelve) Hours.     • flurbiprofen (OCUFEN) 0.03 % ophthalmic solution Administer 0.3 drops to both eyes As Needed.     • furosemide (LASIX) 40 MG tablet Take 1 tablet by mouth 2 (Two) Times a Day. 180 tablet 2   • metOLazone (ZAROXOLYN) 2.5 MG tablet Take 2.5 mg by mouth Daily.     • Mirabegron ER (Myrbetriq) 50 MG tablet sustained-release 24 hour 24 hr tablet Take 50 mg by mouth Daily.     • nitroglycerin (NITRODUR) 0.4 MG/HR patch Place 1 patch on the skin as directed by provider Daily.     • potassium chloride (K-DUR,KLOR-CON) 20 MEQ CR tablet Take 20 mEq by mouth 3 (Three) Times a Day.     • predniSONE (DELTASONE) 5 MG tablet Take 5 mg by mouth Daily.     • predniSONE (DELTASONE) 1 MG tablet Take 1 mg by mouth Daily.     • aspirin 325 MG tablet Take 325 mg by mouth Daily.       Facility-Administered Medications Prior to Visit   Medication Dose Route Frequency Provider Last Rate Last Admin   • ipratropium-albuterol (DUO-NEB) nebulizer solution 3 mL  3 mL Nebulization 4x Daily - RT Mary Avendaño PA-C       • ipratropium-albuterol (DUO-NEB) nebulizer solution 3 mL  3 mL Nebulization 4x Daily - RT Mary Avendaño PA-C           Patient Active Problem List   Diagnosis   • ERIKC (obstructive sleep apnea)   • Edema   • H/O total shoulder replacement   • Other hyperlipidemia   • Ischemic cardiomyopathy   • Morbidly obese (CMS/HCC)   • PFO (patent foramen ovale)   • RBBB (right bundle branch block)   • Rotator cuff arthropathy   • S/P CABG  "(coronary artery bypass graft)   • Stroke (CMS/HCC)   • Trifascicular bundle branch block   • VT (ventricular tachycardia) (CMS/HCC)   • CAD (coronary artery disease)   • Essential hypertension   • Prostate cancer (CMS/HCC)   • Temporal arteritis (CMS/HCC)   • Renal insufficiency   • Pre-diabetes   • PMR (polymyalgia rheumatica) (CMS/[facility])       Advanced Care Planning:  ACP discussion was held with the patient during this visit. Patient has an advance directive (not in EMR), copy requested.    Review of Systems    Compared to one year ago, the patient feels his physical health is better.  Compared to one year ago, the patient feels his mental health is the same.    Reviewed chart for potential of high risk medication in the elderly: yes  Reviewed chart for potential of harmful drug interactions in the elderly:yes    Objective         Vitals:    05/17/21 1106   BP: 122/74   Temp: 97.4 °F (36.3 °C)   Weight: 124 kg (274 lb)   Height: 172.7 cm (68\")       Body mass index is 41.66 kg/m².  Discussed the patient's BMI with him. The BMI is above average; BMI management plan is completed.    Physical Exam  Vitals reviewed.   Constitutional:       Appearance: He is well-developed.   HENT:      Head: Normocephalic and atraumatic.      Mouth/Throat:      Mouth: Mucous membranes are moist.      Pharynx: Oropharynx is clear.   Cardiovascular:      Rate and Rhythm: Normal rate and regular rhythm.      Heart sounds: Normal heart sounds, S1 normal and S2 normal.      Comments: Trace to 1+ edema at sock line  Pulmonary:      Effort: Pulmonary effort is normal.      Breath sounds: Normal breath sounds.   Musculoskeletal:      Cervical back: Neck supple. No tenderness.   Lymphadenopathy:      Cervical: No cervical adenopathy.   Skin:     General: Skin is warm.   Neurological:      Mental Status: He is alert.      Comments: No tremor in outstretched hands    Negative cogwheel.   Psychiatric:         Behavior: Behavior normal. "         Lab Results   Component Value Date     (H) 05/10/2021    CHLPL 138 05/10/2021    TRIG 209 (H) 05/10/2021    HDL 36 (L) 05/10/2021    LDL 67 05/10/2021    VLDL 35 05/10/2021    HGBA1C 6.30 (H) 05/10/2021        Assessment/Plan   Medicare Risks and Personalized Health Plan  CMS Preventative Services Quick Reference  Advance Directive Discussion  Immunizations Discussed/Encouraged (specific immunizations; Shingrix )    The above risks/problems have been discussed with the patient.  Pertinent information has been shared with the patient in the After Visit Summary.  Follow up plans and orders are seen below in the Assessment/Plan Section.    Diagnoses and all orders for this visit:    1. Medicare annual wellness visit, subsequent (Primary)    2. Other hyperlipidemia  -     TSH; Future  -     Basic Metabolic Panel; Future    3. Essential hypertension    4. Coronary artery disease involving native coronary artery of native heart without angina pectoris    5. Pre-diabetes  -     Hemoglobin A1c; Future    6. PMR (polymyalgia rheumatica) (CMS/[facility])    7. RLS (restless legs syndrome)  -     CBC & Differential  -     Ferritin    8. Abnormal finding of blood chemistry, unspecified   -     Ferritin      Follow Up:  Return in about 6 months (around 11/17/2021), or 30 minutes, for Lab Today, Lab Before FUP.     An After Visit Summary and PPPS were given to the patient.         Reviewed cmp, flp, and a1c. LDL is at goal. Recc wt loss to prevent becoming diabetic. Discussed exercising 150 minutes per week. He sees rheum for his PMR and gout; he says his feet are better since being on colchicine and allopurinol.    Discussed RLS; I will check a ferritin. He does not want to go on any more meds at this point and I think that is very reasonable.    We will monitor his tremor. It likely is essential.    I offered to do a swallowing eval but he wants to wait.

## 2021-05-18 LAB
BASOPHILS # BLD AUTO: 0 X10E3/UL (ref 0–0.2)
BASOPHILS NFR BLD AUTO: 0 %
EOSINOPHIL # BLD AUTO: 0.1 X10E3/UL (ref 0–0.4)
EOSINOPHIL NFR BLD AUTO: 1 %
ERYTHROCYTE [DISTWIDTH] IN BLOOD BY AUTOMATED COUNT: 12.6 % (ref 11.6–15.4)
FERRITIN SERPL-MCNC: 136 NG/ML (ref 30–400)
HCT VFR BLD AUTO: 41.8 % (ref 37.5–51)
HGB BLD-MCNC: 14.1 G/DL (ref 13–17.7)
IMM GRANULOCYTES # BLD AUTO: 0.1 X10E3/UL (ref 0–0.1)
IMM GRANULOCYTES NFR BLD AUTO: 1 %
LYMPHOCYTES # BLD AUTO: 2.7 X10E3/UL (ref 0.7–3.1)
LYMPHOCYTES NFR BLD AUTO: 26 %
MCH RBC QN AUTO: 32.3 PG (ref 26.6–33)
MCHC RBC AUTO-ENTMCNC: 33.7 G/DL (ref 31.5–35.7)
MCV RBC AUTO: 96 FL (ref 79–97)
MONOCYTES # BLD AUTO: 1 X10E3/UL (ref 0.1–0.9)
MONOCYTES NFR BLD AUTO: 10 %
NEUTROPHILS # BLD AUTO: 6.3 X10E3/UL (ref 1.4–7)
NEUTROPHILS NFR BLD AUTO: 62 %
PLATELET # BLD AUTO: 240 X10E3/UL (ref 150–450)
RBC # BLD AUTO: 4.36 X10E6/UL (ref 4.14–5.8)
WBC # BLD AUTO: 10.1 X10E3/UL (ref 3.4–10.8)

## 2021-05-25 ENCOUNTER — OFFICE VISIT (OUTPATIENT)
Dept: INTERNAL MEDICINE | Facility: CLINIC | Age: 71
End: 2021-05-25

## 2021-05-25 ENCOUNTER — TELEPHONE (OUTPATIENT)
Dept: INTERNAL MEDICINE | Facility: CLINIC | Age: 71
End: 2021-05-25

## 2021-05-25 VITALS — BODY MASS INDEX: 41.68 KG/M2 | HEIGHT: 68 IN | TEMPERATURE: 96.5 F | WEIGHT: 275 LBS

## 2021-05-25 DIAGNOSIS — M53.3 TRAUMATIC COCCYDYNIA: Primary | ICD-10-CM

## 2021-05-25 PROCEDURE — 99213 OFFICE O/P EST LOW 20 MIN: CPT | Performed by: INTERNAL MEDICINE

## 2021-05-25 NOTE — TELEPHONE ENCOUNTER
Injured his back last Saturday, missed last 2 steps on ladder, in pain. Please advise [phone] cell phone.

## 2021-10-07 ENCOUNTER — TELEPHONE (OUTPATIENT)
Dept: INTERNAL MEDICINE | Facility: CLINIC | Age: 71
End: 2021-10-07

## 2021-10-07 NOTE — TELEPHONE ENCOUNTER
PATIENT WANTS TO KNOW IF DR. COREAS RECEIVED HIS ECG RESULTS FROM DR. REZA PATIENTS CARDIOLOGIST...    PLEASE LET PATIENT KNOW AT: [phone]

## 2021-11-01 DIAGNOSIS — E78.49 OTHER HYPERLIPIDEMIA: Chronic | ICD-10-CM

## 2021-11-01 RX ORDER — ATORVASTATIN CALCIUM 20 MG/1
20 TABLET, FILM COATED ORAL DAILY
Qty: 30 TABLET | Refills: 3 | Status: SHIPPED | OUTPATIENT
Start: 2021-11-01 | End: 2022-02-22

## 2021-11-01 NOTE — TELEPHONE ENCOUNTER
Caller: Silvino Hidalgo Jr.    Relationship: Self      Medication requested (name and dosage):     Requested Prescriptions:   Requested Prescriptions     Pending Prescriptions Disp Refills   • atorvastatin (LIPITOR) 20 MG tablet 30 tablet 3     Sig: Take 1 tablet by mouth Daily.        Pharmacy where request should be sent: [facility] [id number] [place] [address] [address] & [place] - [phone]  - [phone]   [phone]    Additional details provided by patient: 90 DAY SUPPLY        Does the patient have less than a 3 day supply:  [] Yes  [] No    Isidro Reed Rep   11/01/21 11:35 EDT

## 2021-11-18 ENCOUNTER — OFFICE VISIT (OUTPATIENT)
Dept: INTERNAL MEDICINE | Facility: CLINIC | Age: 71
End: 2021-11-18

## 2021-11-18 VITALS
HEIGHT: 68 IN | SYSTOLIC BLOOD PRESSURE: 132 MMHG | DIASTOLIC BLOOD PRESSURE: 82 MMHG | WEIGHT: 277 LBS | BODY MASS INDEX: 41.98 KG/M2 | TEMPERATURE: 96.9 F

## 2021-11-18 DIAGNOSIS — Z23 NEED FOR INFLUENZA VACCINATION: Primary | ICD-10-CM

## 2021-11-18 DIAGNOSIS — I10 ESSENTIAL HYPERTENSION: Chronic | ICD-10-CM

## 2021-11-18 DIAGNOSIS — E11.9 TYPE 2 DIABETES MELLITUS WITHOUT COMPLICATION, WITHOUT LONG-TERM CURRENT USE OF INSULIN (HCC): ICD-10-CM

## 2021-11-18 DIAGNOSIS — E78.49 OTHER HYPERLIPIDEMIA: Chronic | ICD-10-CM

## 2021-11-18 DIAGNOSIS — N28.9 RENAL INSUFFICIENCY: ICD-10-CM

## 2021-11-18 PROCEDURE — G0008 ADMIN INFLUENZA VIRUS VAC: HCPCS | Performed by: INTERNAL MEDICINE

## 2021-11-18 PROCEDURE — 90662 IIV NO PRSV INCREASED AG IM: CPT | Performed by: INTERNAL MEDICINE

## 2021-11-18 PROCEDURE — 99214 OFFICE O/P EST MOD 30 MIN: CPT | Performed by: INTERNAL MEDICINE

## 2021-11-18 NOTE — PROGRESS NOTES
Subjective        Chief Complaint   Patient presents with   • Diabetes           Silvino Hidalgo Jr. is a 71 y.o. male who presents for    Patient Active Problem List   Diagnosis   • ERICK (obstructive sleep apnea)   • Edema   • H/O total shoulder replacement   • Other hyperlipidemia   • Ischemic cardiomyopathy   • Morbidly obese ([facility])   • PFO (patent foramen ovale)   • RBBB (right bundle branch block)   • Rotator cuff arthropathy   • S/P CABG (coronary artery bypass graft)   • Stroke ([facility])   • Trifascicular bundle branch block   • VT (ventricular tachycardia) ([facility])   • CAD (coronary artery disease)   • Essential hypertension   • Prostate cancer ([facility])   • Temporal arteritis ([facility])   • Renal insufficiency   • PMR (polymyalgia rheumatica) ([facility])   • Type 2 diabetes mellitus without complication, without long-term current use of insulin ([facility])       History of Present Illness     He denies chest pain. He is short of breath with steps which is not new. He has edema in the day that goes away at night. She is being weaned off Prednisone to 1 mg daily. He had an echo in Sept with an EF of 50 percent. He is not snacking. He eats twice per day. He has a tremor in his right arm about three times per week with activity. There is no FH of tremor.   No Known Allergies    Current Outpatient Medications on File Prior to Visit   Medication Sig Dispense Refill   • albuterol (PROVENTIL) (2.5 MG/3ML) 0.083% nebulizer solution Take 2.5 mg by nebulization Every 4 (Four) Hours As Needed for Wheezing. 30 vial 1   • allopurinol (ZYLOPRIM) 300 MG tablet      • atorvastatin (LIPITOR) 20 MG tablet Take 1 tablet by mouth Daily. 30 tablet 3   • carvedilol (COREG) 25 MG tablet Take 25 mg by mouth 2 (Two) Times a Day With Meals.     • colchicine 0.6 MG tablet Daily.     • Eliquis 5 MG tablet tablet Every 12 (Twelve) Hours.     • flurbiprofen (OCUFEN) 0.03 % ophthalmic solution Administer 0.3 drops to both eyes As Needed.     • furosemide (LASIX) 40 MG  tablet Take 1 tablet by mouth 2 (Two) Times a Day. 180 tablet 2   • metOLazone (ZAROXOLYN) 2.5 MG tablet Take 2.5 mg by mouth Daily.     • Mirabegron ER (Myrbetriq) 50 MG tablet sustained-release 24 hour 24 hr tablet Take 50 mg by mouth Daily.     • nitroglycerin (NITRODUR) 0.4 MG/HR patch Place 1 patch on the skin as directed by provider Daily.     • potassium chloride (K-DUR,KLOR-CON) 20 MEQ CR tablet Take 20 mEq by mouth 3 (Three) Times a Day.     • predniSONE (DELTASONE) 5 MG tablet Take 1 mg by mouth Daily.       Current Facility-Administered Medications on File Prior to Visit   Medication Dose Route Frequency Provider Last Rate Last Admin   • ipratropium-albuterol (DUO-NEB) nebulizer solution 3 mL  3 mL Nebulization 4x Daily - RT Mary Avendaño PA-C       • ipratropium-albuterol (DUO-NEB) nebulizer solution 3 mL  3 mL Nebulization 4x Daily - RT Mary Avendaño PA-C           Past Medical History:   Diagnosis Date   • Allergic    • Constipation    • Diastasis recti    • Gout    • Myocardial infarction (HCC)    • PFO (patent foramen ovale)    • Pneumonia    • TIA (transient ischemic attack)    • Tubular adenoma of colon        Past Surgical History:   Procedure Laterality Date   • CARDIAC CATHETERIZATION     • CARDIAC DEFIBRILLATOR PLACEMENT  07/22/2019   • CATARACT EXTRACTION     • COLONOSCOPY  03/22/2018    repeat 5 years Dr. Daniel   • CORONARY ARTERY BYPASS GRAFT     • HEMORRHOIDECTOMY     • HERNIA REPAIR     • PACEMAKER IMPLANTATION     • REPLACEMENT TOTAL KNEE Bilateral    • ROTATOR CUFF REPAIR Right    • SHOULDER SURGERY Left     reversal       Family History   Problem Relation Age of Onset   • Alcohol abuse Mother    • Arthritis Mother    • Colon cancer Mother    • Rectal cancer Mother    • Alcohol abuse Father    • Arthritis Father    • Lung cancer Father        Social History     Socioeconomic History   • Marital status:    Tobacco Use   • Smoking status: Former Smoker     Packs/day: 1.00      "Years: 30.00     Pack years: 30.00     Types: Cigarettes     Quit date: 1997     Years since quittin.8   • Smokeless tobacco: Never Used   Substance and Sexual Activity   • Alcohol use: No     Comment: rarely   • Drug use: No   • Sexual activity: Defer           The following portions of the patient's history were reviewed and updated as appropriate: problem list, allergies, current medications, past medical history, past family history, past social history and past surgical history.    Review of Systems    Immunization History   Administered Date(s) Administered   • COVID-19 (PFIZER) 2021, 2021, 10/02/2021   • FLUAD TRI 65YR+ 10/08/2019   • Fluad Quad 65+ 2020   • Fluzone High Dose =>65 Years ([employer] ONLY) 10/08/2018   • Fluzone High-Dose 65+yrs 2021   • Hepatitis A 10/01/2018, 2019   • Pneumococcal Conjugate 13-Valent (PCV13) 10/03/2016   • Pneumococcal Polysaccharide (PPSV23) 10/28/2014   • Tdap 10/14/2015       Objective   Vitals:    21 1112   BP: 132/82   Temp: 96.9 °F (36.1 °C)   Weight: 126 kg (277 lb)   Height: 172.7 cm (68\")     Body mass index is 42.12 kg/m².  Physical Exam  Vitals reviewed.   Constitutional:       Appearance: He is well-developed.   HENT:      Head: Normocephalic and atraumatic.   Cardiovascular:      Rate and Rhythm: Normal rate and regular rhythm.      Heart sounds: Normal heart sounds, S1 normal and S2 normal.   Pulmonary:      Effort: Pulmonary effort is normal.      Breath sounds: Normal breath sounds.   Skin:     General: Skin is warm.   Neurological:      Mental Status: He is alert.      Comments: No tremor in out stretched in arms.    FTN and DELORES are intact   Psychiatric:         Behavior: Behavior normal.         Procedures    Assessment/Plan   Diagnoses and all orders for this visit:    1. Need for influenza vaccination (Primary)  -     Fluzone High-Dose 65+yrs (3473-0151)    2. Type 2 diabetes mellitus without complication, without " long-term current use of insulin (HCC)  -     Ambulatory Referral to Diabetic Education  -     Hemoglobin A1c; Future    3. Renal insufficiency  -     Comprehensive Metabolic Panel; Future  -     Microalbumin / Creatinine Urine Ratio - Urine, Clean Catch; Future    4. Essential hypertension    5. Other hyperlipidemia  -     Lipid Panel With / Chol / HDL Ratio; Future  -     Microalbumin / Creatinine Urine Ratio - Urine, Clean Catch; Future               Reviewed bmp, tsh and a1c. Discussed not walking barefoot and to have an eye exam. Wrote for meter and supplies. Set up DM education. Flu shot today. Recc wt loss. Monitor infrequent tremor in his right arm; it sounds essential.  Return in about 3 months (around 2/18/2022), or 30 minutes, for Lab Before FUP.

## 2021-12-03 ENCOUNTER — TELEPHONE (OUTPATIENT)
Dept: INTERNAL MEDICINE | Facility: CLINIC | Age: 71
End: 2021-12-03

## 2021-12-03 DIAGNOSIS — E11.9 TYPE 2 DIABETES MELLITUS WITHOUT COMPLICATION, WITHOUT LONG-TERM CURRENT USE OF INSULIN (HCC): Primary | ICD-10-CM

## 2021-12-03 RX ORDER — BLOOD-GLUCOSE METER
1 EACH MISCELLANEOUS DAILY
Qty: 1 EACH | Refills: 0 | Status: SHIPPED | OUTPATIENT
Start: 2021-12-03 | End: 2021-12-10 | Stop reason: ALTCHOICE

## 2021-12-03 RX ORDER — LANCETS 33 GAUGE
1 EACH MISCELLANEOUS DAILY
Qty: 100 EACH | Refills: 3 | Status: SHIPPED | OUTPATIENT
Start: 2021-12-03 | End: 2021-12-16 | Stop reason: ALTCHOICE

## 2021-12-03 NOTE — TELEPHONE ENCOUNTER
I hand wrote him the scripts for meter, lancet and strips. You can send in as well. One meter. Directions on lancets and strips are to use daily #30 rf 4 of each

## 2021-12-03 NOTE — TELEPHONE ENCOUNTER
Caller: Silvino Hidalgo Jr    Relationship to Patient: SELF    Pharmacy: [id number] [place] [address] AT [facility] & [place] - [phone] [facility] [phone]     Phone Number: [phone]     Reason for Call: PATIENT CALLED REGARDING HIS DIABETIC SUPPLIES THAT WERE SUPPOSED TO BE CALLED IN FOR HIM. HE WAS INFORMED BY THE PHARMACY THAT THEY ARE WAITING ON MORE INFORMATION FROM DR. COREAS REGARDING WHAT HE'S WANTING TO GIVE FOR THE PATIENT. THIS IS NEEDED BY MEDICARE IN ORDER FOR IT TO BE COVERED.

## 2021-12-09 ENCOUNTER — TELEPHONE (OUTPATIENT)
Dept: INTERNAL MEDICINE | Facility: CLINIC | Age: 71
End: 2021-12-09

## 2021-12-09 NOTE — TELEPHONE ENCOUNTER
[facility] called regarding two prescriptions for a glucometer and supplies one states to order what insurance will cover, they are needing something specific along with the supplies, please call [phone].

## 2021-12-10 RX ORDER — BLOOD-GLUCOSE METER
1 KIT MISCELLANEOUS DAILY
Qty: 1 EACH | Refills: 0 | Status: SHIPPED | OUTPATIENT
Start: 2021-12-10 | End: 2021-12-15 | Stop reason: RX

## 2021-12-13 ENCOUNTER — TELEPHONE (OUTPATIENT)
Dept: INTERNAL MEDICINE | Facility: CLINIC | Age: 71
End: 2021-12-13

## 2021-12-13 NOTE — TELEPHONE ENCOUNTER
PATIENT STATES THAT:   [facility] [address] [address] [facility] AT [facility] & [place] - [phone]  - [phone] FX     NEED THE BRAND NAME FOR THE:  Lancets 33G misc [[id number]] (Order [id number])  PLEASE ADVISE    CALL BACK #: [phone]

## 2021-12-13 NOTE — TELEPHONE ENCOUNTER
I have tried to call and was on hold for 10 min. I need to know what brand they need I have sent in multiple rx for this

## 2021-12-14 DIAGNOSIS — E11.9 TYPE 2 DIABETES MELLITUS WITHOUT COMPLICATION, WITHOUT LONG-TERM CURRENT USE OF INSULIN (HCC): ICD-10-CM

## 2021-12-15 DIAGNOSIS — E11.9 TYPE 2 DIABETES MELLITUS WITHOUT COMPLICATION, WITHOUT LONG-TERM CURRENT USE OF INSULIN (HCC): Primary | ICD-10-CM

## 2021-12-15 RX ORDER — BLOOD-GLUCOSE METER
1 EACH MISCELLANEOUS DAILY
Qty: 1 KIT | Refills: 0 | Status: SHIPPED | OUTPATIENT
Start: 2021-12-15 | End: 2021-12-20 | Stop reason: CLARIF

## 2021-12-15 RX ORDER — BLOOD SUGAR DIAGNOSTIC
STRIP MISCELLANEOUS
Qty: 100 EACH | Refills: 5 | Status: SHIPPED | OUTPATIENT
Start: 2021-12-15 | End: 2021-12-20 | Stop reason: CLARIF

## 2021-12-16 DIAGNOSIS — E11.9 TYPE 2 DIABETES MELLITUS WITHOUT COMPLICATION, WITHOUT LONG-TERM CURRENT USE OF INSULIN (HCC): Primary | ICD-10-CM

## 2021-12-16 RX ORDER — LANCETS 33 GAUGE
EACH MISCELLANEOUS
Qty: 100 EACH | Refills: 3 | Status: SHIPPED | OUTPATIENT
Start: 2021-12-16

## 2021-12-20 ENCOUNTER — TELEPHONE (OUTPATIENT)
Dept: INTERNAL MEDICINE | Facility: CLINIC | Age: 71
End: 2021-12-20

## 2021-12-20 DIAGNOSIS — E11.9 TYPE 2 DIABETES MELLITUS WITHOUT COMPLICATION, WITHOUT LONG-TERM CURRENT USE OF INSULIN (HCC): Primary | ICD-10-CM

## 2021-12-20 RX ORDER — BLOOD-GLUCOSE METER
EACH MISCELLANEOUS
Qty: 1 EACH | Refills: 0 | Status: SHIPPED | OUTPATIENT
Start: 2021-12-20

## 2021-12-20 NOTE — TELEPHONE ENCOUNTER
Caller: DILCIA HAWK    Relationship: PATIENTS WIFE    Best call back number: [phone]    What is your medical concern? PATIENTS WIFE STATES THE PHARMACY IS TELLING HER THE GLUCOSE MONITOR DR COREAS ORDERED IS NOT COMPATIBLE WITH THE TEST STRIPS. COULD YOU PLEASE LOOK INTO THIS AND ADVISE PHARMACY. PATIENTS WIFE SAID THEY JUST GOT THE TEST STRIPS APPROVED AND NOW THE MONITOR ISN'T COMPATIBLE.     Have you been treated for this issue? YES

## 2021-12-20 NOTE — TELEPHONE ENCOUNTER
PT'S WIFE CALLED AGAIN, CLINICAL STAFF NEEDS TO CALL THE PHARMACY, STILL NEEDS AUTHORIZATION FROM DOCTOR

## 2022-02-22 ENCOUNTER — OFFICE VISIT (OUTPATIENT)
Dept: INTERNAL MEDICINE | Facility: CLINIC | Age: 72
End: 2022-02-22

## 2022-02-22 VITALS
DIASTOLIC BLOOD PRESSURE: 82 MMHG | TEMPERATURE: 97.4 F | HEIGHT: 68 IN | SYSTOLIC BLOOD PRESSURE: 116 MMHG | WEIGHT: 258 LBS | BODY MASS INDEX: 39.1 KG/M2

## 2022-02-22 DIAGNOSIS — E78.49 OTHER HYPERLIPIDEMIA: Primary | Chronic | ICD-10-CM

## 2022-02-22 DIAGNOSIS — I10 ESSENTIAL HYPERTENSION: Chronic | ICD-10-CM

## 2022-02-22 DIAGNOSIS — E11.9 TYPE 2 DIABETES MELLITUS WITHOUT COMPLICATION, WITHOUT LONG-TERM CURRENT USE OF INSULIN: ICD-10-CM

## 2022-02-22 PROBLEM — M35.3 PMR (POLYMYALGIA RHEUMATICA) (HCC): Status: RESOLVED | Noted: 2021-05-17 | Resolved: 2022-02-22

## 2022-02-22 PROBLEM — M31.6 TEMPORAL ARTERITIS (HCC): Status: RESOLVED | Noted: 2020-06-01 | Resolved: 2022-02-22

## 2022-02-22 PROBLEM — I47.20 VT (VENTRICULAR TACHYCARDIA): Status: RESOLVED | Noted: 2019-07-22 | Resolved: 2022-02-22

## 2022-02-22 PROCEDURE — 99214 OFFICE O/P EST MOD 30 MIN: CPT | Performed by: INTERNAL MEDICINE

## 2022-02-22 NOTE — PROGRESS NOTES
Subjective        Chief Complaint   Patient presents with   • Diabetes           Silvino Hidalgo Jr. is a 71 y.o. male who presents for    Patient Active Problem List   Diagnosis   • ERICK (obstructive sleep apnea)   • Edema   • H/O total shoulder replacement   • Other hyperlipidemia   • Ischemic cardiomyopathy   • PFO (patent foramen ovale)   • RBBB (right bundle branch block)   • Rotator cuff arthropathy   • S/P CABG (coronary artery bypass graft)   • Stroke ([facility])   • Trifascicular bundle branch block   • CAD (coronary artery disease)   • Essential hypertension   • Renal insufficiency   • Type 2 diabetes mellitus without complication, without long-term current use of insulin ([facility])       History of Present Illness     He has more energy. He has decreased his calorie intake. He has not checked his BP or sugars. He denies chest pain. He is breathing better. He stopped Lipitor 3 months ago as he was sore with it. Dr. Loja wrote for Livalo but he has not started it.  No Known Allergies    Current Outpatient Medications on File Prior to Visit   Medication Sig Dispense Refill   • albuterol (PROVENTIL) (2.5 MG/3ML) 0.083% nebulizer solution Take 2.5 mg by nebulization Every 4 (Four) Hours As Needed for Wheezing. 30 vial 1   • allopurinol (ZYLOPRIM) 300 MG tablet      • Blood Glucose Monitoring Suppl (ONE TOUCH ULTRA 2) w/Device kit Used to test once daily- Dx E11.9 Type 2 diabetes 1 each 0   • carvedilol (COREG) 25 MG tablet Take 25 mg by mouth 2 (Two) Times a Day With Meals.     • colchicine 0.6 MG tablet Daily.     • Eliquis 5 MG tablet tablet Every 12 (Twelve) Hours.     • flurbiprofen (OCUFEN) 0.03 % ophthalmic solution Administer 0.3 drops to both eyes As Needed.     • furosemide (LASIX) 40 MG tablet Take 1 tablet by mouth 2 (Two) Times a Day. 180 tablet 2   • glucose blood test strip Used to test once daily. Dx E11.9 Type 2 diabetes 100 each 2   • metOLazone (ZAROXOLYN) 2.5 MG tablet Take 2.5 mg by mouth Daily.     •  Mirabegron ER (Myrbetriq) 50 MG tablet sustained-release 24 hour 24 hr tablet Take 50 mg by mouth Daily.     • nitroglycerin (NITRODUR) 0.4 MG/HR patch Place 1 patch on the skin as directed by provider Daily.     • OneTouch Delica Lancets 33G misc Use once a daily Dx: Type 2 diabetes mellitus without complication, without long-term current use of insulin [E11.9] 100 each 3   • potassium chloride (K-DUR,KLOR-CON) 20 MEQ CR tablet Take 20 mEq by mouth 3 (Three) Times a Day.     • [DISCONTINUED] atorvastatin (LIPITOR) 20 MG tablet Take 1 tablet by mouth Daily. 30 tablet 3   • [DISCONTINUED] predniSONE (DELTASONE) 5 MG tablet Take 1 mg by mouth Daily.       Current Facility-Administered Medications on File Prior to Visit   Medication Dose Route Frequency Provider Last Rate Last Admin   • ipratropium-albuterol (DUO-NEB) nebulizer solution 3 mL  3 mL Nebulization 4x Daily - RT Mary Avendaño PA-C       • ipratropium-albuterol (DUO-NEB) nebulizer solution 3 mL  3 mL Nebulization 4x Daily - RT Mary Avendaño PA-C           Past Medical History:   Diagnosis Date   • Allergic    • Constipation    • Diastasis recti    • Gout    • Myocardial infarction (HCC)    • PFO (patent foramen ovale)    • Pneumonia    • TIA (transient ischemic attack)    • Tubular adenoma of colon        Past Surgical History:   Procedure Laterality Date   • CARDIAC CATHETERIZATION     • CARDIAC DEFIBRILLATOR PLACEMENT  07/22/2019   • CATARACT EXTRACTION     • COLONOSCOPY  03/22/2018    repeat 5 years Dr. Daniel   • CORONARY ARTERY BYPASS GRAFT     • HEMORRHOIDECTOMY     • HERNIA REPAIR     • PACEMAKER IMPLANTATION     • REPLACEMENT TOTAL KNEE Bilateral    • ROTATOR CUFF REPAIR Right    • SHOULDER SURGERY Left     reversal       Family History   Problem Relation Age of Onset   • Alcohol abuse Mother    • Arthritis Mother    • Colon cancer Mother    • Rectal cancer Mother    • Alcohol abuse Father    • Arthritis Father    • Lung cancer Father        Social  "History     Socioeconomic History   • Marital status:    Tobacco Use   • Smoking status: Former Smoker     Packs/day: 1.00     Years: 30.00     Pack years: 30.00     Types: Cigarettes     Quit date: 1997     Years since quittin.1   • Smokeless tobacco: Never Used   Substance and Sexual Activity   • Alcohol use: No     Comment: rarely   • Drug use: No   • Sexual activity: Defer           The following portions of the patient's history were reviewed and updated as appropriate: problem list, allergies, current medications, past medical history, past family history, past social history and past surgical history.    Review of Systems    Immunization History   Administered Date(s) Administered   • COVID-19 (PFIZER) PURPLE CAP 2021, 2021, 10/02/2021   • FLUAD TRI 65YR+ 10/08/2019   • Fluad Quad 65+ 2020   • Fluzone High Dose =>65 Years ([employer] ONLY) 10/08/2018   • Fluzone High-Dose 65+yrs 2021   • Hepatitis A 10/01/2018, 2019   • Pneumococcal Conjugate 13-Valent (PCV13) 10/03/2016   • Pneumococcal Polysaccharide (PPSV23) 10/28/2014   • Tdap 10/14/2015       Objective   Vitals:    22 0757   BP: 116/82   Temp: 97.4 °F (36.3 °C)   Weight: 117 kg (258 lb)   Height: 172.7 cm (68\")     Body mass index is 39.23 kg/m².  Physical Exam  Vitals reviewed.   Constitutional:       Appearance: He is well-developed.   HENT:      Head: Normocephalic and atraumatic.   Cardiovascular:      Rate and Rhythm: Normal rate and regular rhythm.      Heart sounds: Normal heart sounds, S1 normal and S2 normal.   Pulmonary:      Effort: Pulmonary effort is normal.      Breath sounds: Normal breath sounds.   Skin:     General: Skin is warm.   Neurological:      Mental Status: He is alert.   Psychiatric:         Behavior: Behavior normal.         Procedures    Assessment/Plan   Diagnoses and all orders for this visit:    1. Other hyperlipidemia (Primary)  Comments:  LDL is above 70. He is not taking " Lipitor. He will start the Livalo that was given to him by cards.  Orders:  -     Lipid Panel With / Chol / HDL Ratio; Future    2. Essential hypertension  -     Comprehensive Metabolic Panel; Future    3. Type 2 diabetes mellitus without complication, without long-term current use of insulin (HCC)  Comments:  A1c is at goal.  Orders:  -     Hemoglobin A1c; Future               Reviewed cmp, flp, a1c and microalbumin. BP is good. He wants to have his foot exam next time when he can bring a shoe horn.  Return in about 3 months (around 5/22/2022) for Medicare Wellness, Lab Before FUP.

## 2022-05-05 NOTE — PROGRESS NOTES
Subjective   Chief Complaint   Patient presents with   • Leg Problem     Dropped object on Rt leg 2.5  week ago        History of Present Illness   72 y.o. male with cc right shin swelling and bruising after dropping large object on shin--50# sand bag 2-2.5 weeks ago; he is followed by PCP Dr. Rutherford.     Denies pain or trouble walking. Right shin is bruised and swelling has not yet resolved though it is a little better. He is on Eliquis bid given history of a-fib. Denies dsypnea.      Patient Active Problem List   Diagnosis   • ERICK (obstructive sleep apnea)   • Edema   • H/O total shoulder replacement   • Other hyperlipidemia   • Ischemic cardiomyopathy   • PFO (patent foramen ovale)   • RBBB (right bundle branch block)   • Rotator cuff arthropathy   • S/P CABG (coronary artery bypass graft)   • Stroke ([facility])   • Trifascicular bundle branch block   • CAD (coronary artery disease)   • Essential hypertension   • Renal insufficiency   • Type 2 diabetes mellitus without complication, without long-term current use of insulin ([facility])       No Known Allergies    Current Outpatient Medications on File Prior to Visit   Medication Sig Dispense Refill   • albuterol (PROVENTIL) (2.5 MG/3ML) 0.083% nebulizer solution Take 2.5 mg by nebulization Every 4 (Four) Hours As Needed for Wheezing. 30 vial 1   • allopurinol (ZYLOPRIM) 300 MG tablet 500 mg.     • Blood Glucose Monitoring Suppl (ONE TOUCH ULTRA 2) w/Device kit Used to test once daily- Dx E11.9 Type 2 diabetes 1 each 0   • carvedilol (COREG) 25 MG tablet Take 25 mg by mouth 2 (Two) Times a Day With Meals.     • colchicine 0.6 MG tablet Daily.     • Eliquis 5 MG tablet tablet Every 12 (Twelve) Hours.     • flurbiprofen (OCUFEN) 0.03 % ophthalmic solution Administer 0.3 drops to both eyes As Needed.     • furosemide (LASIX) 40 MG tablet Take 1 tablet by mouth 2 (Two) Times a Day. 180 tablet 2   • glucose blood test strip Used to test once daily. Dx E11.9 Type 2 diabetes 100  each 2   • metOLazone (ZAROXOLYN) 2.5 MG tablet Take 2.5 mg by mouth Daily.     • Mirabegron ER (MYRBETRIQ) 50 MG tablet sustained-release 24 hour 24 hr tablet Take 50 mg by mouth Daily.     • nitroglycerin (NITRODUR) 0.4 MG/HR patch Place 1 patch on the skin as directed by provider Daily.     • OneTouch Delica Lancets 33G misc Use once a daily Dx: Type 2 diabetes mellitus without complication, without long-term current use of insulin [E11.9] 100 each 3   • potassium chloride (K-DUR,KLOR-CON) 20 MEQ CR tablet Take 20 mEq by mouth 3 (Three) Times a Day.       Current Facility-Administered Medications on File Prior to Visit   Medication Dose Route Frequency Provider Last Rate Last Admin   • ipratropium-albuterol (DUO-NEB) nebulizer solution 3 mL  3 mL Nebulization 4x Daily - RT Mary Avendaño PA-C       • ipratropium-albuterol (DUO-NEB) nebulizer solution 3 mL  3 mL Nebulization 4x Daily - RT Mary Avendaño PA-C           Past Medical History:   Diagnosis Date   • Allergic    • Constipation    • Diastasis recti    • Gout    • Myocardial infarction (HCC)    • PFO (patent foramen ovale)    • Pneumonia    • TIA (transient ischemic attack)    • Tubular adenoma of colon        Family History   Problem Relation Age of Onset   • Alcohol abuse Mother    • Arthritis Mother    • Colon cancer Mother    • Rectal cancer Mother    • Alcohol abuse Father    • Arthritis Father    • Lung cancer Father        Social History     Socioeconomic History   • Marital status:    Tobacco Use   • Smoking status: Former Smoker     Packs/day: 1.00     Years: 30.00     Pack years: 30.00     Types: Cigarettes     Quit date: 1997     Years since quittin.3   • Smokeless tobacco: Never Used   Substance and Sexual Activity   • Alcohol use: No     Comment: rarely   • Drug use: No   • Sexual activity: Defer       Past Surgical History:   Procedure Laterality Date   • CARDIAC CATHETERIZATION     • CARDIAC DEFIBRILLATOR PLACEMENT   "07/22/2019   • CATARACT EXTRACTION     • COLONOSCOPY  03/22/2018    repeat 5 years Dr. Daniel   • CORONARY ARTERY BYPASS GRAFT     • HEMORRHOIDECTOMY     • HERNIA REPAIR     • PACEMAKER IMPLANTATION     • REPLACEMENT TOTAL KNEE Bilateral    • ROTATOR CUFF REPAIR Right    • SHOULDER SURGERY Left     reversal       The following portions of the patient's history were reviewed and updated as appropriate: problem list, allergies, current medications, past medical history and past social history.    Review of Systems    Immunization History   Administered Date(s) Administered   • COVID-19 (PFIZER) PURPLE CAP 02/23/2021, 03/16/2021, 10/02/2021   • Covid-19 (Pfizer) Gray Cap 04/14/2022   • FLUAD TRI 65YR+ 10/08/2019   • Fluad Quad 65+ 09/22/2020   • Fluzone High Dose =>65 Years ([employer] ONLY) 10/08/2018   • Fluzone High-Dose 65+yrs 11/18/2021   • Hepatitis A 10/01/2018, 05/01/2019   • Pneumococcal Conjugate 13-Valent (PCV13) 10/03/2016   • Pneumococcal Polysaccharide (PPSV23) 10/28/2014   • Tdap 10/14/2015       Objective   Vitals:    05/06/22 1001   Temp: 97.5 °F (36.4 °C)   Weight: 118 kg (260 lb)   Height: 172.7 cm (68\")     Body mass index is 39.53 kg/m².  Physical Exam  Constitutional:       Appearance: Normal appearance. He is obese.   HENT:      Head: Normocephalic and atraumatic.   Pulmonary:      Effort: Pulmonary effort is normal.   Musculoskeletal:      Comments: Bruising noted at distal aspect of right shin with mild swelling. TTP but without erythema or heat.    Skin:     General: Skin is warm and dry.   Neurological:      Mental Status: He is alert.   Psychiatric:         Mood and Affect: Mood normal.         Procedures    Assessment/Plan   Diagnoses and all orders for this visit:    1. Injury of right shin, initial encounter (Primary)  Comments:  After dropping 50# bag of sand on it 2 weeks ago. He denies pain but it is TTP. Imaging today. He is anticoagulated with Eliquis bid given H/O a-fib.   Orders:  -   "   XR Tibia Fibula 2 View Bilateral    Records reviewed include previous OV with myself as well as labs.     Return for Next scheduled follow up.

## 2022-05-06 ENCOUNTER — OFFICE VISIT (OUTPATIENT)
Dept: INTERNAL MEDICINE | Facility: CLINIC | Age: 72
End: 2022-05-06

## 2022-05-06 ENCOUNTER — HOSPITAL ENCOUNTER (OUTPATIENT)
Dept: GENERAL RADIOLOGY | Facility: HOSPITAL | Age: 72
Discharge: HOME | End: 2022-05-06
Admitting: NURSE PRACTITIONER

## 2022-05-06 VITALS — HEIGHT: 68 IN | TEMPERATURE: 97.5 F | WEIGHT: 260 LBS | BODY MASS INDEX: 39.4 KG/M2

## 2022-05-06 DIAGNOSIS — S89.91XA INJURY OF RIGHT SHIN, INITIAL ENCOUNTER: Primary | ICD-10-CM

## 2022-05-06 PROCEDURE — 73590 X-RAY EXAM OF LOWER LEG: CPT

## 2022-05-06 PROCEDURE — 99213 OFFICE O/P EST LOW 20 MIN: CPT | Performed by: NURSE PRACTITIONER

## 2022-05-27 ENCOUNTER — TELEPHONE (OUTPATIENT)
Dept: INTERNAL MEDICINE | Facility: CLINIC | Age: 72
End: 2022-05-27

## 2022-05-27 ENCOUNTER — OFFICE VISIT (OUTPATIENT)
Dept: INTERNAL MEDICINE | Facility: CLINIC | Age: 72
End: 2022-05-27

## 2022-05-27 VITALS
BODY MASS INDEX: 38.95 KG/M2 | SYSTOLIC BLOOD PRESSURE: 114 MMHG | WEIGHT: 257 LBS | TEMPERATURE: 97.8 F | HEIGHT: 68 IN | DIASTOLIC BLOOD PRESSURE: 76 MMHG

## 2022-05-27 DIAGNOSIS — E78.49 OTHER HYPERLIPIDEMIA: Chronic | ICD-10-CM

## 2022-05-27 DIAGNOSIS — G47.33 OSA (OBSTRUCTIVE SLEEP APNEA): ICD-10-CM

## 2022-05-27 DIAGNOSIS — Z01.818 PRE-OPERATIVE CLEARANCE: ICD-10-CM

## 2022-05-27 DIAGNOSIS — I25.10 CORONARY ARTERY DISEASE INVOLVING NATIVE CORONARY ARTERY OF NATIVE HEART WITHOUT ANGINA PECTORIS: ICD-10-CM

## 2022-05-27 DIAGNOSIS — E11.9 TYPE 2 DIABETES MELLITUS WITHOUT COMPLICATION, WITHOUT LONG-TERM CURRENT USE OF INSULIN: ICD-10-CM

## 2022-05-27 DIAGNOSIS — Z92.241 HISTORY OF RECENT STEROID USE: ICD-10-CM

## 2022-05-27 DIAGNOSIS — I10 ESSENTIAL HYPERTENSION: Chronic | ICD-10-CM

## 2022-05-27 DIAGNOSIS — Z00.00 MEDICARE ANNUAL WELLNESS VISIT, SUBSEQUENT: Primary | ICD-10-CM

## 2022-05-27 PROCEDURE — 1159F MED LIST DOCD IN RCRD: CPT | Performed by: INTERNAL MEDICINE

## 2022-05-27 PROCEDURE — 1170F FXNL STATUS ASSESSED: CPT | Performed by: INTERNAL MEDICINE

## 2022-05-27 PROCEDURE — 99213 OFFICE O/P EST LOW 20 MIN: CPT | Performed by: INTERNAL MEDICINE

## 2022-05-27 PROCEDURE — G0439 PPPS, SUBSEQ VISIT: HCPCS | Performed by: INTERNAL MEDICINE

## 2022-05-27 RX ORDER — POTASSIUM CHLORIDE 1500 MG/1
20 TABLET, FILM COATED, EXTENDED RELEASE ORAL DAILY
COMMUNITY

## 2022-05-27 RX ORDER — FUROSEMIDE 40 MG/1
40 TABLET ORAL DAILY
COMMUNITY

## 2022-05-27 RX ORDER — ATORVASTATIN CALCIUM 20 MG/1
20 TABLET, FILM COATED ORAL DAILY
Qty: 90 TABLET | Refills: 1 | Status: SHIPPED | OUTPATIENT
Start: 2022-05-27 | End: 2022-09-02

## 2022-05-27 RX ORDER — ALLOPURINOL 100 MG/1
TABLET ORAL
COMMUNITY
Start: 2022-05-14

## 2022-05-27 NOTE — TELEPHONE ENCOUNTER
Let him know I checked on what we need to do since last steroid use was about 6 months ago. He needs a cortisol level checked.    It needs to be done at 8 am so if that can be scheduled in next 10 days that would be great.

## 2022-05-27 NOTE — PROGRESS NOTES
The ABCs of the Annual Wellness Visit  Subsequent Medicare Wellness Visit    Chief Complaint   Patient presents with   • Medicare Wellness-subsequent      Subjective    History of Present Illness:  Silvino Hidalgo Jr. is a 72 y.o. male who presents for a Subsequent Medicare Wellness Visit.  He denies chest pain. His breathing has improved with weight loss. He is able to walk up steps better. He has not been checking his BP or sugars. He can have some pain on the back of his right hip with bending over. He needs approval to have his left knee replacement revised; his surgery is in July. He sees Dr. Loja next Friday. He has had no complications with past surgery. He has been off steroids for about 10 months. He uses his CPAP nightly.  The following portions of the patient's history were reviewed and   updated as appropriate: allergies, current medications, past family history, past medical history, past social history, past surgical history and problem list.    Compared to one year ago, the patient feels his physical   health is better.    Compared to one year ago, the patient feels his mental   health is the same.    Recent Hospitalizations:  He was not admitted to the hospital during the last year.       Current Medical Providers:  Patient Care Team:  Juan Alberto Rutherford MD as PCP - General (Internal Medicine)    Outpatient Medications Prior to Visit   Medication Sig Dispense Refill   • albuterol (PROVENTIL) (2.5 MG/3ML) 0.083% nebulizer solution Take 2.5 mg by nebulization Every 4 (Four) Hours As Needed for Wheezing. 30 vial 1   • allopurinol (ZYLOPRIM) 100 MG tablet      • allopurinol (ZYLOPRIM) 300 MG tablet 500 mg.     • Ascorbic Acid (VITAMIN C PO) Take  by mouth.     • Blood Glucose Monitoring Suppl (ONE TOUCH ULTRA 2) w/Device kit Used to test once daily- Dx E11.9 Type 2 diabetes 1 each 0   • carvedilol (COREG) 25 MG tablet Take 25 mg by mouth 2 (Two) Times a Day With Meals.     • colchicine 0.6 MG tablet  Daily.     • Eliquis 5 MG tablet tablet Every 12 (Twelve) Hours.     • flurbiprofen (OCUFEN) 0.03 % ophthalmic solution Administer 0.3 drops to both eyes As Needed.     • furosemide (LASIX) 40 MG tablet Take 40 mg by mouth Daily.     • glucose blood test strip Used to test once daily. Dx E11.9 Type 2 diabetes 100 each 2   • nitroglycerin (NITRODUR) 0.4 MG/HR patch Place 1 patch on the skin as directed by provider Daily.     • OneTouch Delica Lancets 33G misc Use once a daily Dx: Type 2 diabetes mellitus without complication, without long-term current use of insulin [E11.9] 100 each 3   • potassium chloride ER (K-TAB) 20 MEQ tablet controlled-release ER tablet Take 20 mEq by mouth Daily.     • VITAMIN D PO Take  by mouth.     • furosemide (LASIX) 40 MG tablet Take 1 tablet by mouth 2 (Two) Times a Day. 180 tablet 2   • potassium chloride (K-DUR,KLOR-CON) 20 MEQ CR tablet Take 20 mEq by mouth 3 (Three) Times a Day.     • metOLazone (ZAROXOLYN) 2.5 MG tablet Take 2.5 mg by mouth Daily.     • Mirabegron ER (MYRBETRIQ) 50 MG tablet sustained-release 24 hour 24 hr tablet Take 50 mg by mouth Daily.       Facility-Administered Medications Prior to Visit   Medication Dose Route Frequency Provider Last Rate Last Admin   • ipratropium-albuterol (DUO-NEB) nebulizer solution 3 mL  3 mL Nebulization 4x Daily - RT Mary Avendaño PA-C       • ipratropium-albuterol (DUO-NEB) nebulizer solution 3 mL  3 mL Nebulization 4x Daily - RT Mary Avendaño P, PA-C           No opioid medication identified on active medication list. I have reviewed chart for other potential  high risk medication/s and harmful drug interactions in the elderly.          Aspirin is not on active medication list.  Aspirin use is contraindicated for this patient due to: current use of Eliquis.  .    Patient Active Problem List   Diagnosis   • ERICK (obstructive sleep apnea)   • Edema   • H/O total shoulder replacement   • Other hyperlipidemia   • Ischemic cardiomyopathy  "  • PFO (patent foramen ovale)   • RBBB (right bundle branch block)   • Rotator cuff arthropathy   • S/P CABG (coronary artery bypass graft)   • Stroke (HCC)   • Trifascicular bundle branch block   • CAD (coronary artery disease)   • Essential hypertension   • Renal insufficiency   • Type 2 diabetes mellitus without complication, without long-term current use of insulin (HCC)     Advance Care Planning  Advance Directive is not on file.  ACP discussion was held with the patient during this visit. Patient has an advance directive (not in EMR), copy requested.          Objective    Vitals:    05/27/22 1127   BP: 114/76   Temp: 97.8 °F (36.6 °C)   Weight: 117 kg (257 lb)   Height: 172.7 cm (67.99\")     Body mass index is 39.09 kg/m².  Class 2 Severe Obesity (BMI >=35 and <=39.9). Obesity-related health conditions include the following: coronary heart disease. Obesity is unchanged. BMI is is above average; BMI management plan is completed. We discussed portion control and increasing exercise.    Does the patient have evidence of cognitive impairment? No    Physical Exam  Vitals reviewed.   Constitutional:       Appearance: He is well-developed.   HENT:      Head: Normocephalic and atraumatic.   Neck:      Vascular: No carotid bruit.   Cardiovascular:      Rate and Rhythm: Normal rate and regular rhythm.      Heart sounds: Normal heart sounds, S1 normal and S2 normal.      Comments: Trace edema.    Firm lump right shin from where hit with umbrella stand  Pulmonary:      Effort: Pulmonary effort is normal.      Breath sounds: Normal breath sounds.   Feet:      Right foot:      Protective Sensation: 5 sites tested. 4 sites sensed.      Skin integrity: Skin integrity normal.      Left foot:      Protective Sensation: 5 sites tested. 4 sites sensed.      Skin integrity: Skin integrity normal.   Skin:     General: Skin is warm.   Neurological:      Mental Status: He is alert.   Psychiatric:         Behavior: Behavior normal. "       Lab Results   Component Value Date    CHLPL 137 2022    TRIG 67 2022    HDL 33 (L) 2022    LDL 90 2022    VLDL 14 2022    HGBA1C 5.7 (H) 2022            HEALTH RISK ASSESSMENT    Smoking Status:  Social History     Tobacco Use   Smoking Status Former Smoker   • Packs/day: 1.00   • Years: 30.00   • Pack years: 30.00   • Types: Cigarettes   • Quit date: 1997   • Years since quittin.4   Smokeless Tobacco Never Used     Alcohol Consumption:  Social History     Substance and Sexual Activity   Alcohol Use No    Comment: rarely     Fall Risk Screen:    ABELINOADI Fall Risk Assessment was completed, and patient is at LOW risk for falls.Assessment completed on:2022    Depression Screening:  PHQ-2/PHQ-9 Depression Screening 2022   Retired PHQ-9 Total Score -   Retired Total Score -   Little Interest or Pleasure in Doing Things 0-->not at all   Feeling Down, Depressed or Hopeless 0-->not at all   PHQ-9: Brief Depression Severity Measure Score 0       Health Habits and Functional and Cognitive Screening:  Functional & Cognitive Status 2022   Do you have difficulty preparing food and eating? No   Do you have difficulty bathing yourself, getting dressed or grooming yourself? No   Do you have difficulty using the toilet? No   Do you have difficulty moving around from place to place? No   Do you have trouble with steps or getting out of a bed or a chair? No   Current Diet Well Balanced Diet   Dental Exam Not up to date   Eye Exam Not up to date   Exercise (times per week) 4 times per week   Current Exercises Include Yard Work   Current Exercise Activities Include -   Do you need help using the phone?  No   Are you deaf or do you have serious difficulty hearing?  No   Do you need help with transportation? No   Do you need help shopping? No   Do you need help preparing meals?  No   Do you need help with housework?  No   Do you need help with laundry? No   Do you need help  taking your medications? No   Do you need help managing money? No   Do you ever drive or ride in a car without wearing a seat belt? No   Have you felt unusual stress, anger or loneliness in the last month? No   Who do you live with? Spouse   If you need help, do you have trouble finding someone available to you? No   Have you been bothered in the last four weeks by sexual problems? No   Do you have difficulty concentrating, remembering or making decisions? No       Age-appropriate Screening Schedule:  Refer to the list below for future screening recommendations based on patient's age, sex and/or medical conditions. Orders for these recommended tests are listed in the plan section. The patient has been provided with a written plan.    Health Maintenance   Topic Date Due   • ZOSTER VACCINE (1 of 2) Never done   • INFLUENZA VACCINE  08/01/2022   • HEMOGLOBIN A1C  11/20/2022   • URINE MICROALBUMIN  02/15/2023   • DIABETIC EYE EXAM  03/23/2023   • LIPID PANEL  05/20/2023   • DIABETIC FOOT EXAM  05/27/2023   • TDAP/TD VACCINES (2 - Td or Tdap) 10/14/2025              Assessment & Plan   CMS Preventative Services Quick Reference  Risk Factors Identified During Encounter  Immunizations Discussed/Encouraged (specific Immunizations; Shingrix  The above risks/problems have been discussed with the patient.  Follow up actions/plans if indicated are seen below in the Assessment/Plan Section.  Pertinent information has been shared with the patient in the After Visit Summary.    Diagnoses and all orders for this visit:    1. Medicare annual wellness visit, subsequent (Primary)    2. ERICK (obstructive sleep apnea)    3. Type 2 diabetes mellitus without complication, without long-term current use of insulin (HCC)  -     Hemoglobin A1c; Future  -     Microalbumin / Creatinine Urine Ratio - Urine, Clean Catch; Future    4. Coronary artery disease involving native coronary artery of native heart without angina pectoris    5. Essential  hypertension  -     Comprehensive Metabolic Panel; Future    6. Other hyperlipidemia  -     atorvastatin (LIPITOR) 20 MG tablet; Take 1 tablet by mouth Daily.  Dispense: 90 tablet; Refill: 1  -     Lipid Panel With / Chol / HDL Ratio; Future    7. Pre-operative clearance    8. History of recent steroid use  -     Cortisol        Follow Up:   Return in about 3 months (around 8/27/2022), or 30 minutes, for Lab Before FUP.     An After Visit Summary and PPPS were made available to the patient.                 Reviewed cmp, flp and a1c. He will try the Lipitor again. BP is good and a1c is good. He will get clearance letter from Dr. Loja for his upcoming left knee surgery. He will take his CPAP to the hospital. It sounds like his last steroid use was about 6 months so I will get an early am cortisol level.

## 2022-06-06 ENCOUNTER — OFFICE VISIT (OUTPATIENT)
Dept: INTERNAL MEDICINE | Facility: CLINIC | Age: 72
End: 2022-06-06

## 2022-06-06 ENCOUNTER — TELEPHONE (OUTPATIENT)
Dept: INTERNAL MEDICINE | Facility: CLINIC | Age: 72
End: 2022-06-06

## 2022-06-06 VITALS
WEIGHT: 244 LBS | SYSTOLIC BLOOD PRESSURE: 126 MMHG | HEIGHT: 68 IN | TEMPERATURE: 97.5 F | BODY MASS INDEX: 36.98 KG/M2 | HEART RATE: 84 BPM | RESPIRATION RATE: 16 BRPM | DIASTOLIC BLOOD PRESSURE: 62 MMHG

## 2022-06-06 DIAGNOSIS — R31.9 HEMATURIA, UNSPECIFIED TYPE: Primary | ICD-10-CM

## 2022-06-06 DIAGNOSIS — J32.9 SINUSITIS, UNSPECIFIED CHRONICITY, UNSPECIFIED LOCATION: ICD-10-CM

## 2022-06-06 DIAGNOSIS — R30.0 DYSURIA: ICD-10-CM

## 2022-06-06 LAB
BILIRUB BLD-MCNC: NEGATIVE MG/DL
CLARITY, POC: ABNORMAL
COLOR UR: ABNORMAL
EXPIRATION DATE: ABNORMAL
GLUCOSE UR STRIP-MCNC: NEGATIVE MG/DL
KETONES UR QL: NEGATIVE
LEUKOCYTE EST, POC: NEGATIVE
Lab: ABNORMAL
NITRITE UR-MCNC: NEGATIVE MG/ML
PH UR: 5.5 [PH] (ref 5–8)
PROT UR STRIP-MCNC: ABNORMAL MG/DL
RBC # UR STRIP: ABNORMAL /UL
SP GR UR: 1.01 (ref 1–1.03)
UROBILINOGEN UR QL: NORMAL

## 2022-06-06 PROCEDURE — 81003 URINALYSIS AUTO W/O SCOPE: CPT | Performed by: NURSE PRACTITIONER

## 2022-06-06 PROCEDURE — 99214 OFFICE O/P EST MOD 30 MIN: CPT | Performed by: NURSE PRACTITIONER

## 2022-06-06 RX ORDER — LEVOFLOXACIN 500 MG/1
500 TABLET, FILM COATED ORAL DAILY
Qty: 14 TABLET | Refills: 0 | Status: SHIPPED | OUTPATIENT
Start: 2022-06-06 | End: 2022-09-02

## 2022-06-06 NOTE — PROGRESS NOTES
Subjective   Chief Complaint   Patient presents with   • Blood in Urine   • Dysuria       History of Present Illness   72 y.o. male presents with hematuria and dysuria ongoing times 2 umberto; he is followed by PCP Dr. Rutherford.     Thought he noticed blood in his urine a couple of weeks ago with morning void but he wasn't sure that was what he was seeing. Resolved. Saw it again a week later but again wasn't quite sure it was blood but this morning it was obvious he had blood in his urine. Dysuria for 2-3 weeks and stream isn't as strong. Notes urine is cloudy. Pain in his right hip but none in his back. Denies fever or chills. Denies N/V. Some pain in his left groin also.     Also notes increased sinus congestion, drainage and pressure in his face.     Anticoagulated with Eliquis for A-fib. Quit smoking after open heart surgery. Kidney stone over 20 years ago. Also reports sinus congestion. Scheduled for surgery later in July.      Patient Active Problem List   Diagnosis   • ERICK (obstructive sleep apnea)   • Edema   • H/O total shoulder replacement   • Other hyperlipidemia   • Ischemic cardiomyopathy   • PFO (patent foramen ovale)   • RBBB (right bundle branch block)   • Rotator cuff arthropathy   • S/P CABG (coronary artery bypass graft)   • Stroke (HCC)   • Trifascicular bundle branch block   • CAD (coronary artery disease)   • Essential hypertension   • Renal insufficiency   • Type 2 diabetes mellitus without complication, without long-term current use of insulin (HCC)       No Known Allergies    Current Outpatient Medications on File Prior to Visit   Medication Sig Dispense Refill   • albuterol (PROVENTIL) (2.5 MG/3ML) 0.083% nebulizer solution Take 2.5 mg by nebulization Every 4 (Four) Hours As Needed for Wheezing. 30 vial 1   • allopurinol (ZYLOPRIM) 100 MG tablet      • allopurinol (ZYLOPRIM) 300 MG tablet 500 mg.     • Ascorbic Acid (VITAMIN C PO) Take  by mouth.     • atorvastatin (LIPITOR) 20 MG tablet Take  1 tablet by mouth Daily. 90 tablet 1   • Blood Glucose Monitoring Suppl (ONE TOUCH ULTRA 2) w/Device kit Used to test once daily- Dx E11.9 Type 2 diabetes 1 each 0   • carvedilol (COREG) 25 MG tablet Take 25 mg by mouth 2 (Two) Times a Day With Meals.     • colchicine 0.6 MG tablet Daily.     • Eliquis 5 MG tablet tablet Every 12 (Twelve) Hours.     • flurbiprofen (OCUFEN) 0.03 % ophthalmic solution Administer 0.3 drops to both eyes As Needed.     • furosemide (LASIX) 40 MG tablet Take 40 mg by mouth Daily.     • glucose blood test strip Used to test once daily. Dx E11.9 Type 2 diabetes 100 each 2   • nitroglycerin (NITRODUR) 0.4 MG/HR patch Place 1 patch on the skin as directed by provider Daily.     • OneTouch Delica Lancets 33G misc Use once a daily Dx: Type 2 diabetes mellitus without complication, without long-term current use of insulin [E11.9] 100 each 3   • potassium chloride ER (K-TAB) 20 MEQ tablet controlled-release ER tablet Take 20 mEq by mouth Daily.     • VITAMIN D PO Take  by mouth.       Current Facility-Administered Medications on File Prior to Visit   Medication Dose Route Frequency Provider Last Rate Last Admin   • ipratropium-albuterol (DUO-NEB) nebulizer solution 3 mL  3 mL Nebulization 4x Daily - RT Mary Avendaño PA-C       • ipratropium-albuterol (DUO-NEB) nebulizer solution 3 mL  3 mL Nebulization 4x Daily - RT Mary Avendaño PA-C           Past Medical History:   Diagnosis Date   • Allergic    • Constipation    • Diastasis recti    • Gout    • Myocardial infarction (HCC)    • PFO (patent foramen ovale)    • Pneumonia    • TIA (transient ischemic attack)    • Tubular adenoma of colon        Family History   Problem Relation Age of Onset   • Alcohol abuse Mother    • Arthritis Mother    • Colon cancer Mother    • Rectal cancer Mother    • Alcohol abuse Father    • Arthritis Father    • Lung cancer Father        Social History     Socioeconomic History   • Marital status:    Tobacco  "Use   • Smoking status: Former Smoker     Packs/day: 1.00     Years: 30.00     Pack years: 30.00     Types: Cigarettes     Quit date: 1997     Years since quittin.4   • Smokeless tobacco: Never Used   Substance and Sexual Activity   • Alcohol use: No     Comment: rarely   • Drug use: No   • Sexual activity: Defer       Past Surgical History:   Procedure Laterality Date   • CARDIAC CATHETERIZATION     • CARDIAC DEFIBRILLATOR PLACEMENT  2019   • CATARACT EXTRACTION     • COLONOSCOPY  2018    repeat 5 years Dr. Daniel   • CORONARY ARTERY BYPASS GRAFT     • HEMORRHOIDECTOMY     • HERNIA REPAIR     • PACEMAKER IMPLANTATION     • REPLACEMENT TOTAL KNEE Bilateral    • ROTATOR CUFF REPAIR Right    • SHOULDER SURGERY Left     reversal       The following portions of the patient's history were reviewed and updated as appropriate: problem list, allergies, current medications, past medical history and past social history.    Review of Systems    Immunization History   Administered Date(s) Administered   • COVID-19 (PFIZER) PURPLE CAP 2021, 2021, 10/02/2021   • Covid-19 (Pfizer) Gray Cap 2022   • FLUAD TRI 65YR+ 10/08/2019   • Fluad Quad 65+ 2020   • Fluzone High Dose =>65 Years ([employer]ax[employer] ONLY) 10/08/2018   • Fluzone High-Dose 65+yrs 2021   • Hepatitis A 10/01/2018, 2019   • Pneumococcal Conjugate 13-Valent (PCV13) 10/03/2016   • Pneumococcal Polysaccharide (PPSV23) 10/28/2014   • Tdap 10/14/2015       Objective   Vitals:    22 1452   BP: 126/62   Pulse: 84   Resp: 16   Temp: 97.5 °F (36.4 °C)   Weight: 111 kg (244 lb)   Height: 172.7 cm (68\")     Body mass index is 37.1 kg/m².  Physical Exam  Vitals reviewed.   Constitutional:       Appearance: Normal appearance. He is well-developed. He is obese.   HENT:      Head: Normocephalic and atraumatic.   Cardiovascular:      Rate and Rhythm: Normal rate and regular rhythm.      Heart sounds: Normal heart sounds, S1 normal " and S2 normal.   Pulmonary:      Effort: Pulmonary effort is normal.      Breath sounds: Normal breath sounds.   Abdominal:      General: Bowel sounds are normal. There is no distension.      Palpations: Abdomen is soft.   Genitourinary:     Prostate: Normal.      Rectum: Normal.   Skin:     General: Skin is warm and dry.   Neurological:      Mental Status: He is alert.   Psychiatric:         Mood and Affect: Mood normal.         Behavior: Behavior normal.         Procedures    Assessment & Plan   Diagnoses and all orders for this visit:    1. Hematuria, unspecified type (Primary)  -     POCT urinalysis dipstick, automated  -     Urinalysis With Culture If Indicated - Urine, Clean Catch    2. Dysuria  -     POCT urinalysis dipstick, automated  -     Urinalysis With Culture If Indicated - Urine, Clean Catch  -     levoFLOXacin (Levaquin) 500 MG tablet; Take 1 tablet by mouth Daily.  Dispense: 14 tablet; Refill: 0    3. Sinusitis, unspecified chronicity, unspecified location    Levaquin as above given hematuria with dysuria while UA and culture pending. Understands CT abd/pelvis with contrast and cystoscopy if UC negative.  consult after reviewing results. Anticoagulated with Eliquis for atrial fibrillation. Former smoker with cessation in 1997 after heart surgery. H/O kidney stones over 20 years ago. H/O PRCA--he has pellets and has not seen  for several years.     34 minutes spent with patient taking history, reviewing labs, examining patient and counseling and coordinating care.     Return in about 2 weeks (around 6/20/2022).

## 2022-06-06 NOTE — TELEPHONE ENCOUNTER
Laly wants this patient seen by Dr. Rutherford in the next two weeks.  I have no where to put this patient.  Is there anywhere that I can work this patient in?

## 2022-06-07 LAB
APPEARANCE UR: CLEAR
BACTERIA #/AREA URNS HPF: ABNORMAL /[HPF]
BILIRUB UR QL STRIP: NEGATIVE
CASTS URNS QL MICRO: ABNORMAL /LPF
COLOR UR: YELLOW
EPI CELLS #/AREA URNS HPF: ABNORMAL /HPF (ref 0–10)
GLUCOSE UR QL STRIP: NEGATIVE
HGB UR QL STRIP: ABNORMAL
KETONES UR QL STRIP: NEGATIVE
LEUKOCYTE ESTERASE UR QL STRIP: NEGATIVE
MICRO URNS: ABNORMAL
NITRITE UR QL STRIP: NEGATIVE
PH UR STRIP: 5.5 [PH] (ref 5–7.5)
PROT UR QL STRIP: ABNORMAL
RBC #/AREA URNS HPF: >30 /HPF (ref 0–2)
SP GR UR STRIP: 1.01 (ref 1–1.03)
URINALYSIS REFLEX: ABNORMAL
UROBILINOGEN UR STRIP-MCNC: 0.2 MG/DL (ref 0.2–1)
WBC #/AREA URNS HPF: ABNORMAL /HPF (ref 0–5)

## 2022-06-09 LAB — CORTIS SERPL-MCNC: 4.5 UG/DL

## 2022-06-14 ENCOUNTER — OFFICE VISIT (OUTPATIENT)
Dept: INTERNAL MEDICINE | Facility: CLINIC | Age: 72
End: 2022-06-14

## 2022-06-14 VITALS
BODY MASS INDEX: 36.98 KG/M2 | TEMPERATURE: 97.8 F | SYSTOLIC BLOOD PRESSURE: 130 MMHG | WEIGHT: 244 LBS | DIASTOLIC BLOOD PRESSURE: 80 MMHG | HEIGHT: 68 IN

## 2022-06-14 DIAGNOSIS — R79.89 LOW SERUM CORTISOL LEVEL: ICD-10-CM

## 2022-06-14 DIAGNOSIS — N39.0 URINARY TRACT INFECTION WITH HEMATURIA, SITE UNSPECIFIED: Primary | ICD-10-CM

## 2022-06-14 DIAGNOSIS — R31.9 URINARY TRACT INFECTION WITH HEMATURIA, SITE UNSPECIFIED: Primary | ICD-10-CM

## 2022-06-14 PROCEDURE — 99214 OFFICE O/P EST MOD 30 MIN: CPT | Performed by: INTERNAL MEDICINE

## 2022-06-14 NOTE — PROGRESS NOTES
Subjective        Chief Complaint   Patient presents with   • Urinary Tract Infection           Silvino Hidalgo Jr. is a 72 y.o. male who presents for    Patient Active Problem List   Diagnosis   • ERICK (obstructive sleep apnea)   • Edema   • H/O total shoulder replacement   • Other hyperlipidemia   • Ischemic cardiomyopathy   • PFO (patent foramen ovale)   • RBBB (right bundle branch block)   • Rotator cuff arthropathy   • S/P CABG (coronary artery bypass graft)   • Stroke ([facility])   • Trifascicular bundle branch block   • CAD (coronary artery disease)   • Essential hypertension   • Renal insufficiency   • Type 2 diabetes mellitus without complication, without long-term current use of insulin ([facility])       History of Present Illness     His dysuria and hematuria have resolved with Levaquin. He denies fever, chills, dyspnea or fatigue. He saw Dr. Loja who said he has no contraindications for left knee replacement (redo) in July. He has not had complications with surgery in the past.  No Known Allergies    Current Outpatient Medications on File Prior to Visit   Medication Sig Dispense Refill   • albuterol (PROVENTIL) (2.5 MG/3ML) 0.083% nebulizer solution Take 2.5 mg by nebulization Every 4 (Four) Hours As Needed for Wheezing. 30 vial 1   • allopurinol (ZYLOPRIM) 100 MG tablet      • allopurinol (ZYLOPRIM) 300 MG tablet 500 mg.     • Ascorbic Acid (VITAMIN C PO) Take  by mouth.     • atorvastatin (LIPITOR) 20 MG tablet Take 1 tablet by mouth Daily. 90 tablet 1   • Blood Glucose Monitoring Suppl (ONE TOUCH ULTRA 2) w/Device kit Used to test once daily- Dx E11.9 Type 2 diabetes 1 each 0   • carvedilol (COREG) 25 MG tablet Take 25 mg by mouth 2 (Two) Times a Day With Meals.     • colchicine 0.6 MG tablet Daily.     • Eliquis 5 MG tablet tablet Every 12 (Twelve) Hours.     • flurbiprofen (OCUFEN) 0.03 % ophthalmic solution Administer 0.3 drops to both eyes As Needed.     • furosemide (LASIX) 40 MG tablet Take 40 mg by mouth  Daily.     • glucose blood test strip Used to test once daily. Dx E11.9 Type 2 diabetes 100 each 2   • levoFLOXacin (Levaquin) 500 MG tablet Take 1 tablet by mouth Daily. 14 tablet 0   • nitroglycerin (NITRODUR) 0.4 MG/HR patch Place 1 patch on the skin as directed by provider Daily.     • OneTouch Delica Lancets 33G misc Use once a daily Dx: Type 2 diabetes mellitus without complication, without long-term current use of insulin [E11.9] 100 each 3   • potassium chloride ER (K-TAB) 20 MEQ tablet controlled-release ER tablet Take 20 mEq by mouth Daily.     • VITAMIN D PO Take  by mouth.       Current Facility-Administered Medications on File Prior to Visit   Medication Dose Route Frequency Provider Last Rate Last Admin   • ipratropium-albuterol (DUO-NEB) nebulizer solution 3 mL  3 mL Nebulization 4x Daily - RT Mary Avendaño PA-C       • ipratropium-albuterol (DUO-NEB) nebulizer solution 3 mL  3 mL Nebulization 4x Daily - RT Mary Avendaño PA-C           Past Medical History:   Diagnosis Date   • Allergic    • Constipation    • Diastasis recti    • Gout    • Myocardial infarction (HCC)    • PFO (patent foramen ovale)    • Pneumonia    • TIA (transient ischemic attack)    • Tubular adenoma of colon        Past Surgical History:   Procedure Laterality Date   • CARDIAC CATHETERIZATION     • CARDIAC DEFIBRILLATOR PLACEMENT  07/22/2019   • CATARACT EXTRACTION     • COLONOSCOPY  03/22/2018    repeat 5 years Dr. Daniel   • CORONARY ARTERY BYPASS GRAFT     • HEMORRHOIDECTOMY     • HERNIA REPAIR     • PACEMAKER IMPLANTATION     • REPLACEMENT TOTAL KNEE Bilateral    • ROTATOR CUFF REPAIR Right    • SHOULDER SURGERY Left     reversal       Family History   Problem Relation Age of Onset   • Alcohol abuse Mother    • Arthritis Mother    • Colon cancer Mother    • Rectal cancer Mother    • Alcohol abuse Father    • Arthritis Father    • Lung cancer Father        Social History     Socioeconomic History   • Marital status:   "  Tobacco Use   • Smoking status: Former Smoker     Packs/day: 1.00     Years: 30.00     Pack years: 30.00     Types: Cigarettes     Quit date: 1997     Years since quittin.4   • Smokeless tobacco: Never Used   Substance and Sexual Activity   • Alcohol use: No     Comment: rarely   • Drug use: No   • Sexual activity: Defer           The following portions of the patient's history were reviewed and updated as appropriate: problem list, allergies, current medications, past medical history, past family history, past social history and past surgical history.    Review of Systems    Immunization History   Administered Date(s) Administered   • COVID-19 (PFIZER) PURPLE CAP 2021, 2021, 10/02/2021   • Covid-19 (Pfizer) Gray Cap 2022   • FLUAD TRI 65YR+ 10/08/2019   • Fluad Quad 65+ 2020   • Fluzone High Dose =>65 Years ([employer]axcare ONLY) 10/08/2018   • Fluzone High-Dose 65+yrs 2021   • Hepatitis A 10/01/2018, 2019   • Pneumococcal Conjugate 13-Valent (PCV13) 10/03/2016   • Pneumococcal Polysaccharide (PPSV23) 10/28/2014   • Tdap 10/14/2015       Objective   Vitals:    22 0746   BP: 130/80   Temp: 97.8 °F (36.6 °C)   Weight: 111 kg (244 lb)   Height: 172.7 cm (67.99\")     Body mass index is 37.11 kg/m².  Physical Exam  Vitals reviewed.   Constitutional:       Appearance: He is well-developed.   HENT:      Head: Normocephalic and atraumatic.   Neck:      Vascular: No carotid bruit.   Cardiovascular:      Rate and Rhythm: Normal rate and regular rhythm.      Heart sounds: Normal heart sounds, S1 normal and S2 normal.   Pulmonary:      Effort: Pulmonary effort is normal.      Breath sounds: Normal breath sounds.   Abdominal:      General: There is no distension.      Palpations: There is no mass.      Tenderness: There is no abdominal tenderness.   Skin:     General: Skin is warm.   Neurological:      Mental Status: He is alert.   Psychiatric:         Behavior: Behavior normal. "         Procedures    Assessment & Plan   Diagnoses and all orders for this visit:    1. Urinary tract infection with hematuria, site unspecified (Primary)  -     Ambulatory Referral to Urology    2. Low serum cortisol level (HCC)               He has seen Dr. Loja for pre op. Get copy of that note. Reviewed cortisol which is low in light of steroids in the last year. I have spoken with Dr. Menjivar on 6/16 about doing hydrocortisone 50 mg before surgery and 25 mg every 8 hours afterward up to 24 hours. He will follow Dr. Loja's guidance about NOAC. UTI symptoms have resolved. [facility] did not run the urine for culture but he feels much better after taking Levaquin. Get into urologist for f/u. He has seen Dr. Do in the past. No  contraindications for surgery.  No follow-ups on file.   0 = independent

## 2022-06-15 ENCOUNTER — TELEPHONE (OUTPATIENT)
Dept: INTERNAL MEDICINE | Facility: CLINIC | Age: 72
End: 2022-06-15

## 2022-06-15 NOTE — TELEPHONE ENCOUNTER
Caller: Silvino Hidalgo Jr.    Relationship: Self    Best call back number: [phone]    What test was performed: CORTISOL     When was the test performed: 6/14    Where was the test performed: THE OFFICE    Additional notes: PATIENT WOULD LIKE TO KNOW THESE RESULTS AND WOULD LIKE A COPY OF THEM. HE STATES THAT HE WOULD LIKE TO PICK THEM UP AT THE OFFICE IF THIS IS POSSIBLE.

## 2022-06-15 NOTE — TELEPHONE ENCOUNTER
We discussed at OV. His am cortisol is low since he has been on steroids in the last year and I am trying to get a hold of Dr. Menjivar to discuss IV steroids on way to OR.

## 2022-06-15 NOTE — TELEPHONE ENCOUNTER
Left results with his wife per his verbal release consent form but would like to hear what his PCP has to say about it  06/15/22 [facility]

## 2022-07-08 ENCOUNTER — TELEPHONE (OUTPATIENT)
Dept: INTERNAL MEDICINE | Facility: CLINIC | Age: 72
End: 2022-07-08

## 2022-07-08 NOTE — TELEPHONE ENCOUNTER
Caller: Silvino Hidalgo Jr.    Relationship: Self    Best call back number: [phone]    What orders are you requesting LAB TESTS     In what timeframe would the patient need to come in: AS SOON AS POSSIBLE    Additional notes: PATIENT IS REQUESTING PAPER COPY OF HIS CARTISOL LAB RESULTS PRINTED OUT TO , NEEDS FOR KNEE SURGERY. PLEASE ADVISE.          CT of chest?

## 2022-07-12 ENCOUNTER — TELEPHONE (OUTPATIENT)
Dept: INTERNAL MEDICINE | Facility: CLINIC | Age: 72
End: 2022-07-12

## 2022-08-10 ENCOUNTER — TELEPHONE (OUTPATIENT)
Dept: INTERNAL MEDICINE | Facility: CLINIC | Age: 72
End: 2022-08-10

## 2022-08-10 ENCOUNTER — OFFICE VISIT (OUTPATIENT)
Dept: INTERNAL MEDICINE | Facility: CLINIC | Age: 72
End: 2022-08-10

## 2022-08-10 VITALS — WEIGHT: 236 LBS | HEIGHT: 68 IN | BODY MASS INDEX: 35.77 KG/M2 | TEMPERATURE: 97.3 F

## 2022-08-10 DIAGNOSIS — R31.0 GROSS HEMATURIA: Primary | ICD-10-CM

## 2022-08-10 LAB
BILIRUB BLD-MCNC: NEGATIVE MG/DL
CLARITY, POC: CLEAR
COLOR UR: YELLOW
EXPIRATION DATE: ABNORMAL
GLUCOSE UR STRIP-MCNC: NEGATIVE MG/DL
KETONES UR QL: NEGATIVE
LEUKOCYTE EST, POC: NEGATIVE
Lab: ABNORMAL
NITRITE UR-MCNC: NEGATIVE MG/ML
PH UR: 6 [PH] (ref 5–8)
PROT UR STRIP-MCNC: NEGATIVE MG/DL
RBC # UR STRIP: ABNORMAL /UL
SP GR UR: 1.01 (ref 1–1.03)
UROBILINOGEN UR QL: NORMAL

## 2022-08-10 PROCEDURE — 99214 OFFICE O/P EST MOD 30 MIN: CPT | Performed by: INTERNAL MEDICINE

## 2022-08-10 PROCEDURE — 81003 URINALYSIS AUTO W/O SCOPE: CPT | Performed by: INTERNAL MEDICINE

## 2022-08-10 RX ORDER — NITROFURANTOIN 25; 75 MG/1; MG/1
100 CAPSULE ORAL 2 TIMES DAILY
Qty: 14 CAPSULE | Refills: 0 | Status: SHIPPED | OUTPATIENT
Start: 2022-08-10 | End: 2022-09-02

## 2022-08-10 RX ORDER — TRAMADOL HYDROCHLORIDE 50 MG/1
TABLET ORAL
COMMUNITY
Start: 2022-07-19 | End: 2023-01-24

## 2022-08-10 RX ORDER — OXYCODONE HYDROCHLORIDE AND ACETAMINOPHEN 5; 325 MG/1; MG/1
1 TABLET ORAL EVERY 6 HOURS PRN
COMMUNITY
Start: 2022-07-19 | End: 2022-08-12

## 2022-08-10 RX ORDER — PREGABALIN 75 MG/1
CAPSULE ORAL
COMMUNITY
Start: 2022-07-19 | End: 2023-02-28

## 2022-08-10 NOTE — PROGRESS NOTES
"Chief Complaint  Blood in Urine    Subjective        Silvino Hidalgo Jr. presents to [facility]  History of Present Illness here with hematuria - started during the night when he went to the bathroom.  There was some blood droplets from his urethra after urination.  He also noticed for 2 weeks that he has a split urine stream.  Thinks this has been since his surgery.  He is 3 week s/p L TKA.  Had a UTI early June with hematuria but that resolved.  Only took 11 of 14 day Levaquin due to side effects.   Now having mild burning but gross hematuria. No increased frequency today.  No fever/chills/nausea.     Objective   Vital Signs:  Temp 97.3 °F (36.3 °C)   Ht 172.7 cm (67.99\")   Wt 107 kg (236 lb)   BMI 35.89 kg/m²   Estimated body mass index is 35.89 kg/m² as calculated from the following:    Height as of this encounter: 172.7 cm (67.99\").    Weight as of this encounter: 107 kg (236 lb).          Physical Exam  Constitutional:       General: He is not in acute distress.     Appearance: Normal appearance.   Abdominal:      Tenderness: There is no abdominal tenderness. There is no right CVA tenderness or left CVA tenderness.        Result Review :                Assessment and Plan   Diagnoses and all orders for this visit:    1. Gross hematuria (Primary)  Comments:  will treat empicially for recurrent UTI and send for urological eval- if urine stream decreases, call immediately.  Orders:  -     POCT urinalysis dipstick, automated    Other orders  -     nitrofurantoin, macrocrystal-monohydrate, (Macrobid) 100 MG capsule; Take 1 capsule by mouth 2 (Two) Times a Day.  Dispense: 14 capsule; Refill: 0             Follow Up   Return for Keep previously scheduled appointment.  Patient was given instructions and counseling regarding his condition or for health maintenance advice. Please see specific information pulled into the AVS if appropriate.       "

## 2022-08-10 NOTE — TELEPHONE ENCOUNTER
Patient states he has blood in his urine since last night, he is not sure if the pain killers ( Percocet, Oxycodein 6 hours as needed, Tramadol and Pregadalin) every night he is on is causing this, patient had a knee replacement, please call [phone] or cell [phone].

## 2022-08-22 ENCOUNTER — TELEPHONE (OUTPATIENT)
Dept: INTERNAL MEDICINE | Facility: CLINIC | Age: 72
End: 2022-08-22

## 2022-08-22 NOTE — TELEPHONE ENCOUNTER
Pt informed that we could not find any message that needed to be relayed. Confirmed lab appt for next week tho.

## 2022-08-22 NOTE — TELEPHONE ENCOUNTER
Patient received a call and was unable to get to the phone in time, not sure if the call came from the office or not, patient is also scheduled for labs on Monday 8/29/22, please advise?  [phone].

## 2022-08-29 ENCOUNTER — TELEPHONE (OUTPATIENT)
Dept: INTERNAL MEDICINE | Facility: CLINIC | Age: 72
End: 2022-08-29

## 2022-08-29 NOTE — TELEPHONE ENCOUNTER
Pt said he was supposed to leave a urine to see if he has blood in his urine. I see nothing in the system.

## 2022-09-02 ENCOUNTER — OFFICE VISIT (OUTPATIENT)
Dept: INTERNAL MEDICINE | Facility: CLINIC | Age: 72
End: 2022-09-02

## 2022-09-02 VITALS
DIASTOLIC BLOOD PRESSURE: 78 MMHG | BODY MASS INDEX: 35.46 KG/M2 | SYSTOLIC BLOOD PRESSURE: 132 MMHG | TEMPERATURE: 97.8 F | WEIGHT: 234 LBS | HEIGHT: 68 IN

## 2022-09-02 DIAGNOSIS — E11.9 TYPE 2 DIABETES MELLITUS WITHOUT COMPLICATION, WITHOUT LONG-TERM CURRENT USE OF INSULIN: ICD-10-CM

## 2022-09-02 DIAGNOSIS — I10 ESSENTIAL HYPERTENSION: Chronic | ICD-10-CM

## 2022-09-02 DIAGNOSIS — E78.49 OTHER HYPERLIPIDEMIA: Primary | Chronic | ICD-10-CM

## 2022-09-02 PROCEDURE — 99214 OFFICE O/P EST MOD 30 MIN: CPT | Performed by: INTERNAL MEDICINE

## 2022-09-02 RX ORDER — OXYCODONE HYDROCHLORIDE AND ACETAMINOPHEN 5; 325 MG/1; MG/1
TABLET ORAL
COMMUNITY
Start: 2022-08-23 | End: 2023-01-24

## 2022-09-02 NOTE — PROGRESS NOTES
Subjective        Chief Complaint   Patient presents with   • Hyperlipidemia           Silvino Hidalog Jr. is a 72 y.o. male who presents for    Patient Active Problem List   Diagnosis   • ERICK (obstructive sleep apnea)   • Edema   • Other hyperlipidemia   • Ischemic cardiomyopathy   • PFO (patent foramen ovale)   • RBBB (right bundle branch block)   • Rotator cuff arthropathy   • S/P CABG (coronary artery bypass graft)   • Stroke ([facility])   • Trifascicular bundle branch block   • CAD (coronary artery disease)   • Essential hypertension   • Renal insufficiency   • Type 2 diabetes mellitus without complication, without long-term current use of insulin ([facility])       History of Present Illness     He is having left lateral leg pain after having his left knee replaced; the pain is at night. His swelling is unchanged. It feels likes a nerve. He has back pain that is unchanged. His SBP was 124 recently. He had blood in his urine. He then saw Dr. Castrejon. He started him on abx. He is to have a cysto and a CT.  Allergies   Allergen Reactions   • Levofloxacin Headache and Nausea And Vomiting     Other reaction(s): Headache, Nausea     • Prednisone Swelling     Other reaction(s): Swelling         Current Outpatient Medications on File Prior to Visit   Medication Sig Dispense Refill   • albuterol (PROVENTIL) (2.5 MG/3ML) 0.083% nebulizer solution Take 2.5 mg by nebulization Every 4 (Four) Hours As Needed for Wheezing. 30 vial 1   • allopurinol (ZYLOPRIM) 100 MG tablet      • allopurinol (ZYLOPRIM) 300 MG tablet 500 mg.     • Ascorbic Acid (VITAMIN C PO) Take  by mouth.     • Blood Glucose Monitoring Suppl (ONE TOUCH ULTRA 2) w/Device kit Used to test once daily- Dx E11.9 Type 2 diabetes 1 each 0   • carvedilol (COREG) 25 MG tablet Take 25 mg by mouth 2 (Two) Times a Day With Meals.     • colchicine 0.6 MG tablet Daily.     • Eliquis 5 MG tablet tablet Every 12 (Twelve) Hours.     • flurbiprofen (OCUFEN) 0.03 % ophthalmic solution  Administer 0.3 drops to both eyes As Needed.     • furosemide (LASIX) 40 MG tablet Take 40 mg by mouth Daily.     • glucose blood test strip Used to test once daily. Dx E11.9 Type 2 diabetes 100 each 2   • nitroglycerin (NITRODUR) 0.4 MG/HR patch Place 1 patch on the skin as directed by provider Daily.     • OneTouch Delica Lancets 33G misc Use once a daily Dx: Type 2 diabetes mellitus without complication, without long-term current use of insulin [E11.9] 100 each 3   • oxyCODONE-acetaminophen (PERCOCET) 5-325 MG per tablet      • potassium chloride ER (K-TAB) 20 MEQ tablet controlled-release ER tablet Take 20 mEq by mouth Daily.     • pregabalin (LYRICA) 75 MG capsule      • traMADol (ULTRAM) 50 MG tablet      • VITAMIN D PO Take  by mouth.     • [DISCONTINUED] atorvastatin (LIPITOR) 20 MG tablet Take 1 tablet by mouth Daily. 90 tablet 1   • [DISCONTINUED] levoFLOXacin (Levaquin) 500 MG tablet Take 1 tablet by mouth Daily. 14 tablet 0   • [DISCONTINUED] nitrofurantoin, macrocrystal-monohydrate, (Macrobid) 100 MG capsule Take 1 capsule by mouth 2 (Two) Times a Day. 14 capsule 0     Current Facility-Administered Medications on File Prior to Visit   Medication Dose Route Frequency Provider Last Rate Last Admin   • ipratropium-albuterol (DUO-NEB) nebulizer solution 3 mL  3 mL Nebulization 4x Daily - RT Mary Avendaño PA-C       • ipratropium-albuterol (DUO-NEB) nebulizer solution 3 mL  3 mL Nebulization 4x Daily - RT Mary Avendaño PA-C           Past Medical History:   Diagnosis Date   • Allergic    • Constipation    • Diastasis recti    • Gout    • Myocardial infarction (HCC)    • PFO (patent foramen ovale)    • Pneumonia    • TIA (transient ischemic attack)    • Tubular adenoma of colon        Past Surgical History:   Procedure Laterality Date   • CARDIAC CATHETERIZATION     • CARDIAC DEFIBRILLATOR PLACEMENT  07/22/2019   • CATARACT EXTRACTION     • COLONOSCOPY  03/22/2018    repeat 5 years Dr. Daniel   • CORONARY  "ARTERY BYPASS GRAFT     • HEMORRHOIDECTOMY     • HERNIA REPAIR     • PACEMAKER IMPLANTATION     • REPLACEMENT TOTAL KNEE Bilateral    • REPLACEMENT TOTAL KNEE Left    • ROTATOR CUFF REPAIR Right    • SHOULDER SURGERY Left     reversal       Family History   Problem Relation Age of Onset   • Alcohol abuse Mother    • Arthritis Mother    • Colon cancer Mother    • Rectal cancer Mother    • Alcohol abuse Father    • Arthritis Father    • Lung cancer Father        Social History     Socioeconomic History   • Marital status:    Tobacco Use   • Smoking status: Former Smoker     Packs/day: 1.00     Years: 30.00     Pack years: 30.00     Types: Cigarettes     Quit date: 1997     Years since quittin.6   • Smokeless tobacco: Never Used   Substance and Sexual Activity   • Alcohol use: No     Comment: rarely   • Drug use: No   • Sexual activity: Defer           The following portions of the patient's history were reviewed and updated as appropriate: problem list, allergies, current medications, past medical history, past family history, past social history and past surgical history.    Review of Systems    Immunization History   Administered Date(s) Administered   • COVID-19 (PFIZER) PURPLE CAP 2021, 2021, 10/02/2021   • Covid-19 (Pfizer) Gray Cap 2022   • FLUAD TRI 65YR+ 10/08/2019   • Fluad Quad 65+ 2020   • Fluzone High Dose =>65 Years ([employer] ONLY) 10/08/2018   • Fluzone High-Dose 65+yrs 2021   • Hepatitis A 10/01/2018, 2019   • Pneumococcal Conjugate 13-Valent (PCV13) 10/03/2016   • Pneumococcal Polysaccharide (PPSV23) 10/28/2014   • Tdap 10/14/2015       Objective   Vitals:    22 1222   BP: 132/78   Temp: 97.8 °F (36.6 °C)   Weight: 106 kg (234 lb)   Height: 172.7 cm (67.99\")     Body mass index is 35.59 kg/m².  Physical Exam  Vitals reviewed.   Constitutional:       Appearance: He is well-developed.   HENT:      Head: Normocephalic and atraumatic. "   Cardiovascular:      Rate and Rhythm: Normal rate and regular rhythm.      Heart sounds: Normal heart sounds, S1 normal and S2 normal.   Pulmonary:      Effort: Pulmonary effort is normal.      Breath sounds: Normal breath sounds.   Musculoskeletal:      Comments: Scar left anterior knee. No cords or redness in left leg   Skin:     General: Skin is warm.   Neurological:      Mental Status: He is alert.   Psychiatric:         Behavior: Behavior normal.         Procedures    Assessment & Plan   Diagnoses and all orders for this visit:    1. Other hyperlipidemia (Primary)  Comments:  Declines statin to get LDL below 70.    2. Essential hypertension  Comments:  BP is good.  Orders:  -     Comprehensive Metabolic Panel; Future    3. Type 2 diabetes mellitus without complication, without long-term current use of insulin (HCC)  Comments:  a1c is excellent  Orders:  -     Hemoglobin A1c; Future               Reviewed cmp, flp and a1c. Declines statin. He will call Dr. Menjivar for left lateral leg pain; doubt DVT.   Return in about 6 months (around 3/2/2023), or 30 minutes, for Lab Before FUP.

## 2023-01-24 ENCOUNTER — OFFICE VISIT (OUTPATIENT)
Dept: INTERNAL MEDICINE | Facility: CLINIC | Age: 73
End: 2023-01-24
Payer: MEDICARE

## 2023-01-24 ENCOUNTER — TELEPHONE (OUTPATIENT)
Dept: INTERNAL MEDICINE | Facility: CLINIC | Age: 73
End: 2023-01-24

## 2023-01-24 VITALS
DIASTOLIC BLOOD PRESSURE: 80 MMHG | BODY MASS INDEX: 34.86 KG/M2 | TEMPERATURE: 98.1 F | HEIGHT: 68 IN | WEIGHT: 230 LBS | SYSTOLIC BLOOD PRESSURE: 130 MMHG

## 2023-01-24 DIAGNOSIS — J01.10 ACUTE FRONTAL SINUSITIS, RECURRENCE NOT SPECIFIED: Primary | ICD-10-CM

## 2023-01-24 PROCEDURE — 99213 OFFICE O/P EST LOW 20 MIN: CPT | Performed by: INTERNAL MEDICINE

## 2023-01-24 RX ORDER — CEFDINIR 300 MG/1
300 CAPSULE ORAL 2 TIMES DAILY
Qty: 20 CAPSULE | Refills: 0 | Status: ON HOLD | OUTPATIENT
Start: 2023-01-24 | End: 2023-02-13

## 2023-01-24 NOTE — PROGRESS NOTES
Subjective        Chief Complaint   Patient presents with   • Nasal Congestion     Head congestion , cough , headaches            Silvino Hidalgo Jr. is a 72 y.o. male who presents for    Patient Active Problem List   Diagnosis   • ERICK (obstructive sleep apnea)   • Edema   • Other hyperlipidemia   • Ischemic cardiomyopathy   • PFO (patent foramen ovale)   • RBBB (right bundle branch block)   • Rotator cuff arthropathy   • S/P CABG (coronary artery bypass graft)   • Stroke ([facility])   • Trifascicular bundle branch block   • CAD (coronary artery disease)   • Essential hypertension   • Renal insufficiency   • Type 2 diabetes mellitus without complication, without long-term current use of insulin ([facility])       History of Present Illness     He has been sick for a month with a frontal HA with green nasal DC. He is not coughing anything up. He denies fever or chills.  Allergies   Allergen Reactions   • Levofloxacin Headache and Nausea And Vomiting     Other reaction(s): Headache, Nausea     • Prednisone Swelling     Other reaction(s): Swelling         Current Outpatient Medications on File Prior to Visit   Medication Sig Dispense Refill   • albuterol (PROVENTIL) (2.5 MG/3ML) 0.083% nebulizer solution Take 2.5 mg by nebulization Every 4 (Four) Hours As Needed for Wheezing. 30 vial 1   • allopurinol (ZYLOPRIM) 100 MG tablet      • allopurinol (ZYLOPRIM) 300 MG tablet 500 mg.     • Ascorbic Acid (VITAMIN C PO) Take  by mouth.     • Blood Glucose Monitoring Suppl (ONE TOUCH ULTRA 2) w/Device kit Used to test once daily- Dx E11.9 Type 2 diabetes 1 each 0   • carvedilol (COREG) 25 MG tablet Take 25 mg by mouth 2 (Two) Times a Day With Meals.     • colchicine 0.6 MG tablet Daily.     • Eliquis 5 MG tablet tablet Every 12 (Twelve) Hours.     • flurbiprofen (OCUFEN) 0.03 % ophthalmic solution Administer 0.3 drops to both eyes As Needed.     • furosemide (LASIX) 40 MG tablet Take 40 mg by mouth Daily.     • glucose blood test strip Used  to test once daily. Dx E11.9 Type 2 diabetes 100 each 2   • nitroglycerin (NITRODUR) 0.4 MG/HR patch Place 1 patch on the skin as directed by provider Daily.     • OneTouch Delica Lancets 33G misc Use once a daily Dx: Type 2 diabetes mellitus without complication, without long-term current use of insulin [E11.9] 100 each 3   • potassium chloride ER (K-TAB) 20 MEQ tablet controlled-release ER tablet Take 20 mEq by mouth Daily.     • pregabalin (LYRICA) 75 MG capsule      • VITAMIN D PO Take  by mouth.     • [DISCONTINUED] oxyCODONE-acetaminophen (PERCOCET) 5-325 MG per tablet      • [DISCONTINUED] traMADol (ULTRAM) 50 MG tablet        Current Facility-Administered Medications on File Prior to Visit   Medication Dose Route Frequency Provider Last Rate Last Admin   • ipratropium-albuterol (DUO-NEB) nebulizer solution 3 mL  3 mL Nebulization 4x Daily - RT Mary Avendaño PA-C       • ipratropium-albuterol (DUO-NEB) nebulizer solution 3 mL  3 mL Nebulization 4x Daily - RT Mary Avendaño PA-C           Past Medical History:   Diagnosis Date   • Allergic    • Constipation    • Diastasis recti    • Gout    • Myocardial infarction (HCC)    • Pneumonia    • TIA (transient ischemic attack)    • Tubular adenoma of colon        Past Surgical History:   Procedure Laterality Date   • CARDIAC CATHETERIZATION     • CARDIAC DEFIBRILLATOR PLACEMENT  07/22/2019   • CATARACT EXTRACTION     • COLONOSCOPY  03/22/2018    repeat 5 years Dr. Daniel   • CORONARY ARTERY BYPASS GRAFT     • HEMORRHOIDECTOMY     • HERNIA REPAIR     • PACEMAKER IMPLANTATION     • REPLACEMENT TOTAL KNEE Bilateral    • REPLACEMENT TOTAL KNEE Left 2022   • ROTATOR CUFF REPAIR Right    • SHOULDER SURGERY Left     reversal       Family History   Problem Relation Age of Onset   • Alcohol abuse Mother    • Arthritis Mother    • Colon cancer Mother    • Rectal cancer Mother    • Alcohol abuse Father    • Arthritis Father    • Lung cancer Father        Social History  "    Socioeconomic History   • Marital status:    Tobacco Use   • Smoking status: Former     Packs/day: 1.00     Years: 30.00     Pack years: 30.00     Types: Cigarettes     Quit date: 1997     Years since quittin.0   • Smokeless tobacco: Never   Substance and Sexual Activity   • Alcohol use: No     Comment: rarely   • Drug use: No   • Sexual activity: Defer           The following portions of the patient's history were reviewed and updated as appropriate: problem list, allergies, current medications, past medical history, past family history, past social history and past surgical history.    Review of Systems    Immunization History   Administered Date(s) Administered   • COVID-19 (PFIZER) BIVALENT BOOSTER 12+YRS 2022   • COVID-19 (PFIZER) PURPLE CAP 2021, 2021, 10/02/2021   • Covid-19 (Pfizer) Gray Cap 2022   • FLUAD TRI 65YR+ 10/08/2019   • Fluad Quad 65+ 2020   • Fluzone High Dose =>65 Years ([employer] ONLY) 10/08/2018   • Fluzone High-Dose 65+yrs 2021   • Hepatitis A 10/01/2018, 2019   • Pneumococcal Conjugate 13-Valent (PCV13) 10/03/2016   • Pneumococcal Polysaccharide (PPSV23) 10/28/2014   • Tdap 10/14/2015       Objective   Vitals:    23 1555   BP: 130/80   Temp: 98.1 °F (36.7 °C)   TempSrc: Temporal   Weight: 104 kg (230 lb)   Height: 172.7 cm (67.99\")     Body mass index is 34.98 kg/m².  Physical Exam  Vitals reviewed.   Constitutional:       Appearance: He is well-developed.   HENT:      Head: Normocephalic and atraumatic.      Mouth/Throat:      Mouth: Mucous membranes are moist.      Pharynx: Oropharynx is clear.      Comments: Frontal and maxillary sinuses NT  Cardiovascular:      Rate and Rhythm: Normal rate and regular rhythm.      Heart sounds: Normal heart sounds, S1 normal and S2 normal.   Pulmonary:      Effort: Pulmonary effort is normal.      Breath sounds: Normal breath sounds.   Skin:     General: Skin is warm.   Neurological:      " Mental Status: He is alert.   Psychiatric:         Behavior: Behavior normal.         Procedures    Assessment & Plan   Diagnoses and all orders for this visit:    1. Acute frontal sinusitis, recurrence not specified (Primary)  -     cefdinir (OMNICEF) 300 MG capsule; Take 1 capsule by mouth 2 (Two) Times a Day.  Dispense: 20 capsule; Refill: 0                 No follow-ups on file.

## 2023-01-24 NOTE — TELEPHONE ENCOUNTER
Caller: Silvino Hidalgo Jr.    Relationship: Self    Best call back number: [phone]    PATIENT CALLED TODAY REGARDING SOME HEAD CONGESTION, COUGHING, AND GREEN/YELLOW PHLEGM HE HAS. HE HAS HAD IT FOR A MONTH.  THE OFFICE HAS ZERO AVAILABILITY TODAY AND THE PATIENT IS NOT ON MYCHART    HE WAS TRYING TO GET AN APPOINTMENT FOR ANTIBIOTICS.    PLEASE CALL AND ADVISE

## 2023-02-09 ENCOUNTER — TRANSCRIBE ORDERS (OUTPATIENT)
Dept: CARDIOLOGY | Facility: CLINIC | Age: 73
End: 2023-02-09
Payer: MEDICARE

## 2023-02-09 DIAGNOSIS — Z01.810 PRE-OPERATIVE CARDIOVASCULAR EXAMINATION: Primary | ICD-10-CM

## 2023-02-09 DIAGNOSIS — I20.0 UNSTABLE ANGINA: Primary | ICD-10-CM

## 2023-02-09 DIAGNOSIS — Z13.6 SCREENING FOR ISCHEMIC HEART DISEASE: ICD-10-CM

## 2023-02-10 ENCOUNTER — LAB (OUTPATIENT)
Dept: LAB | Facility: HOSPITAL | Age: 73
End: 2023-02-10
Payer: MEDICARE

## 2023-02-10 DIAGNOSIS — Z13.6 SCREENING FOR ISCHEMIC HEART DISEASE: ICD-10-CM

## 2023-02-10 DIAGNOSIS — Z01.810 PRE-OPERATIVE CARDIOVASCULAR EXAMINATION: ICD-10-CM

## 2023-02-10 PROBLEM — I20.0 UNSTABLE ANGINA (HCC): Status: ACTIVE | Noted: 2023-02-10

## 2023-02-10 LAB
ANION GAP SERPL CALCULATED.3IONS-SCNC: 8 MMOL/L (ref 5–15)
BASOPHILS # BLD AUTO: 0.05 10*3/MM3 (ref 0–0.2)
BASOPHILS NFR BLD AUTO: 0.6 % (ref 0–1.5)
BUN SERPL-MCNC: 17 MG/DL (ref 8–23)
BUN/CREAT SERPL: 16.2 (ref 7–25)
CALCIUM SPEC-SCNC: 9.7 MG/DL (ref 8.6–10.5)
CHLORIDE SERPL-SCNC: 104 MMOL/L (ref 98–107)
CO2 SERPL-SCNC: 28 MMOL/L (ref 22–29)
CREAT SERPL-MCNC: 1.05 MG/DL (ref 0.76–1.27)
DEPRECATED RDW RBC AUTO: 44.3 FL (ref 37–54)
EGFRCR SERPLBLD CKD-EPI 2021: 74.5 ML/MIN/1.73
EOSINOPHIL # BLD AUTO: 0.55 10*3/MM3 (ref 0–0.4)
EOSINOPHIL NFR BLD AUTO: 6.4 % (ref 0.3–6.2)
ERYTHROCYTE [DISTWIDTH] IN BLOOD BY AUTOMATED COUNT: 12.8 % (ref 12.3–15.4)
GLUCOSE SERPL-MCNC: 91 MG/DL (ref 65–99)
HCT VFR BLD AUTO: 39.1 % (ref 37.5–51)
HGB BLD-MCNC: 13.3 G/DL (ref 13–17.7)
IMM GRANULOCYTES # BLD AUTO: 0.03 10*3/MM3 (ref 0–0.05)
IMM GRANULOCYTES NFR BLD AUTO: 0.3 % (ref 0–0.5)
LYMPHOCYTES # BLD AUTO: 3.13 10*3/MM3 (ref 0.7–3.1)
LYMPHOCYTES NFR BLD AUTO: 36.4 % (ref 19.6–45.3)
MCH RBC QN AUTO: 32.1 PG (ref 26.6–33)
MCHC RBC AUTO-ENTMCNC: 34 G/DL (ref 31.5–35.7)
MCV RBC AUTO: 94.4 FL (ref 79–97)
MONOCYTES # BLD AUTO: 0.84 10*3/MM3 (ref 0.1–0.9)
MONOCYTES NFR BLD AUTO: 9.8 % (ref 5–12)
NEUTROPHILS NFR BLD AUTO: 4.01 10*3/MM3 (ref 1.7–7)
NEUTROPHILS NFR BLD AUTO: 46.5 % (ref 42.7–76)
NRBC BLD AUTO-RTO: 0 /100 WBC (ref 0–0.2)
PLATELET # BLD AUTO: 238 10*3/MM3 (ref 140–450)
PMV BLD AUTO: 9.4 FL (ref 6–12)
POTASSIUM SERPL-SCNC: 4.4 MMOL/L (ref 3.5–5.2)
RBC # BLD AUTO: 4.14 10*6/MM3 (ref 4.14–5.8)
SODIUM SERPL-SCNC: 140 MMOL/L (ref 136–145)
WBC NRBC COR # BLD: 8.61 10*3/MM3 (ref 3.4–10.8)

## 2023-02-10 PROCEDURE — 80048 BASIC METABOLIC PNL TOTAL CA: CPT

## 2023-02-10 PROCEDURE — 36415 COLL VENOUS BLD VENIPUNCTURE: CPT

## 2023-02-10 PROCEDURE — 85025 COMPLETE CBC W/AUTO DIFF WBC: CPT

## 2023-02-13 ENCOUNTER — HOSPITAL ENCOUNTER (OUTPATIENT)
Facility: HOSPITAL | Age: 73
Setting detail: HOSPITAL OUTPATIENT SURGERY
Discharge: HOME | End: 2023-02-13
Attending: INTERNAL MEDICINE | Admitting: INTERNAL MEDICINE
Payer: MEDICARE

## 2023-02-13 VITALS
BODY MASS INDEX: 34.86 KG/M2 | WEIGHT: 230 LBS | TEMPERATURE: 98.7 F | RESPIRATION RATE: 18 BRPM | HEART RATE: 63 BPM | SYSTOLIC BLOOD PRESSURE: 128 MMHG | OXYGEN SATURATION: 96 % | DIASTOLIC BLOOD PRESSURE: 65 MMHG | HEIGHT: 68 IN

## 2023-02-13 DIAGNOSIS — I20.0 UNSTABLE ANGINA: ICD-10-CM

## 2023-02-13 LAB
ACT BLD: 233 SECONDS (ref 82–152)
GLUCOSE BLDC GLUCOMTR-MCNC: 94 MG/DL (ref 70–130)

## 2023-02-13 PROCEDURE — C1887 CATHETER, GUIDING: HCPCS | Performed by: INTERNAL MEDICINE

## 2023-02-13 PROCEDURE — 82962 GLUCOSE BLOOD TEST: CPT

## 2023-02-13 PROCEDURE — C1769 GUIDE WIRE: HCPCS | Performed by: INTERNAL MEDICINE

## 2023-02-13 PROCEDURE — S0260 H&P FOR SURGERY: HCPCS | Performed by: INTERNAL MEDICINE

## 2023-02-13 PROCEDURE — 25010000002 FENTANYL CITRATE (PF) 50 MCG/ML SOLUTION: Performed by: INTERNAL MEDICINE

## 2023-02-13 PROCEDURE — 25010000002 HEPARIN (PORCINE) PER 1000 UNITS: Performed by: INTERNAL MEDICINE

## 2023-02-13 PROCEDURE — 0 IOPAMIDOL PER 1 ML: Performed by: INTERNAL MEDICINE

## 2023-02-13 PROCEDURE — 93459 L HRT ART/GRFT ANGIO: CPT | Performed by: INTERNAL MEDICINE

## 2023-02-13 PROCEDURE — C1894 INTRO/SHEATH, NON-LASER: HCPCS | Performed by: INTERNAL MEDICINE

## 2023-02-13 PROCEDURE — 25010000002 MIDAZOLAM PER 1 MG: Performed by: INTERNAL MEDICINE

## 2023-02-13 PROCEDURE — 99153 MOD SED SAME PHYS/QHP EA: CPT | Performed by: INTERNAL MEDICINE

## 2023-02-13 PROCEDURE — 85347 COAGULATION TIME ACTIVATED: CPT

## 2023-02-13 PROCEDURE — 99152 MOD SED SAME PHYS/QHP 5/>YRS: CPT | Performed by: INTERNAL MEDICINE

## 2023-02-13 RX ORDER — SODIUM CHLORIDE 0.9 % (FLUSH) 0.9 %
10 SYRINGE (ML) INJECTION AS NEEDED
Status: DISCONTINUED | OUTPATIENT
Start: 2023-02-13 | End: 2023-02-13 | Stop reason: HOSPADM

## 2023-02-13 RX ORDER — LIDOCAINE HYDROCHLORIDE 20 MG/ML
INJECTION, SOLUTION INFILTRATION; PERINEURAL
Status: DISCONTINUED | OUTPATIENT
Start: 2023-02-13 | End: 2023-02-13 | Stop reason: HOSPADM

## 2023-02-13 RX ORDER — NITROGLYCERIN 0.4 MG/1
0.4 TABLET SUBLINGUAL
Status: DISCONTINUED | OUTPATIENT
Start: 2023-02-13 | End: 2023-02-13 | Stop reason: HOSPADM

## 2023-02-13 RX ORDER — VERAPAMIL HYDROCHLORIDE 2.5 MG/ML
INJECTION, SOLUTION INTRAVENOUS
Status: DISCONTINUED | OUTPATIENT
Start: 2023-02-13 | End: 2023-02-13 | Stop reason: HOSPADM

## 2023-02-13 RX ORDER — ASPIRIN 325 MG
325 TABLET ORAL DAILY
COMMUNITY

## 2023-02-13 RX ORDER — FENTANYL CITRATE 50 UG/ML
INJECTION, SOLUTION INTRAMUSCULAR; INTRAVENOUS
Status: DISCONTINUED | OUTPATIENT
Start: 2023-02-13 | End: 2023-02-13 | Stop reason: HOSPADM

## 2023-02-13 RX ORDER — SODIUM CHLORIDE 0.9 % (FLUSH) 0.9 %
10 SYRINGE (ML) INJECTION EVERY 12 HOURS SCHEDULED
Status: DISCONTINUED | OUTPATIENT
Start: 2023-02-13 | End: 2023-02-13 | Stop reason: HOSPADM

## 2023-02-13 RX ORDER — HEPARIN SODIUM 1000 [USP'U]/ML
INJECTION, SOLUTION INTRAVENOUS; SUBCUTANEOUS
Status: DISCONTINUED | OUTPATIENT
Start: 2023-02-13 | End: 2023-02-13 | Stop reason: HOSPADM

## 2023-02-13 RX ORDER — MIDAZOLAM HYDROCHLORIDE 1 MG/ML
INJECTION INTRAMUSCULAR; INTRAVENOUS
Status: DISCONTINUED | OUTPATIENT
Start: 2023-02-13 | End: 2023-02-13 | Stop reason: HOSPADM

## 2023-02-13 RX ORDER — SODIUM CHLORIDE 9 MG/ML
125 INJECTION, SOLUTION INTRAVENOUS CONTINUOUS
Status: DISCONTINUED | OUTPATIENT
Start: 2023-02-13 | End: 2023-02-13 | Stop reason: HOSPADM

## 2023-02-13 RX ORDER — NITROGLYCERIN 0.4 MG/1
0.4 TABLET SUBLINGUAL
COMMUNITY

## 2023-02-13 RX ORDER — ACETAMINOPHEN 325 MG/1
650 TABLET ORAL EVERY 4 HOURS PRN
Status: DISCONTINUED | OUTPATIENT
Start: 2023-02-13 | End: 2023-02-13 | Stop reason: HOSPADM

## 2023-02-13 RX ORDER — SODIUM CHLORIDE 9 MG/ML
40 INJECTION, SOLUTION INTRAVENOUS AS NEEDED
Status: DISCONTINUED | OUTPATIENT
Start: 2023-02-13 | End: 2023-02-13 | Stop reason: HOSPADM

## 2023-02-13 RX ORDER — ROSUVASTATIN CALCIUM 5 MG/1
2.5 TABLET, COATED ORAL DAILY
COMMUNITY

## 2023-02-13 RX ADMIN — NITROGLYCERIN 0.4 MG: 0.4 TABLET SUBLINGUAL at 13:22

## 2023-02-13 RX ADMIN — SODIUM CHLORIDE 125 ML/HR: 9 INJECTION, SOLUTION INTRAVENOUS at 08:57

## 2023-02-13 NOTE — H&P
Date of Hospital Visit: 23  Encounter Provider: Alexsander Torres MD  Place of Service: [facility] CARDIOLOGY  Patient Name: Silvino Hidalgo Jr.  :1950  [id number]    Chief complaint: Chest discomfort    History of Present Illness: 72-year-old gentleman he had a four-vessel bypass in  [place] at that time it was felt that his bypasses were open he did have a ICD implanted prophylactically its not gone off.  He has a history of hypertension hyperlipidemia but in the last couple of weeks he has been having chest discomfort that he describes it is angina and it does sound like it gets better with nitroglycerin gets a little short of breath has come of a funny feeling when he goes on it seems like it is happening a little bit more often with little or no activity.  He has not had any bleeding difficulty no PND orthopnea edema    Past Medical History:   Diagnosis Date   • Allergic    • CHF (congestive heart failure) ([facility])    • Constipation    • Coronary artery disease    • Diastasis recti    • Gout    • Hyperlipidemia    • Hypertension    • Myocardial infarction (HCC)    • Pneumonia    • Sleep apnea     Bi-Pap machine   • Stroke (HCC)    • TIA (transient ischemic attack)    • Tubular adenoma of colon        Past Surgical History:   Procedure Laterality Date   • CARDIAC CATHETERIZATION     • CARDIAC DEFIBRILLATOR PLACEMENT  2019   • CATARACT EXTRACTION     • COLONOSCOPY  2018    repeat 5 years Dr. Daniel   • CORONARY ARTERY BYPASS GRAFT     • HEMORRHOIDECTOMY     • HERNIA REPAIR     • PACEMAKER IMPLANTATION     • REPLACEMENT TOTAL KNEE Bilateral    • REPLACEMENT TOTAL KNEE Left    • ROTATOR CUFF REPAIR Right    • SHOULDER SURGERY Left     reversal       Facility-Administered Medications Prior to Admission   Medication Dose Route Frequency Provider Last Rate Last Admin   • [DISCONTINUED] ipratropium-albuterol (DUO-NEB) nebulizer  solution 3 mL  3 mL Nebulization 4x Daily - RT Avendaño, Mary P, PA-C       • [DISCONTINUED] ipratropium-albuterol (DUO-NEB) nebulizer solution 3 mL  3 mL Nebulization 4x Daily - RT Avendaño, Mary P, PA-C         Medications Prior to Admission   Medication Sig Dispense Refill Last Dose   • allopurinol (ZYLOPRIM) 100 MG tablet    2/13/2023   • allopurinol (ZYLOPRIM) 300 MG tablet 500 mg.   2/13/2023   • Ascorbic Acid (VITAMIN C PO) Take  by mouth.   2/12/2023   • aspirin 325 MG tablet Take 325 mg by mouth Daily.   2/12/2023   • carvedilol (COREG) 25 MG tablet Take 25 mg by mouth 2 (Two) Times a Day With Meals.   2/13/2023   • colchicine 0.6 MG tablet Daily.   2/12/2023   • esomeprazole (nexIUM) 20 MG capsule Take 20 mg by mouth Every Morning Before Breakfast.   2/12/2023   • furosemide (LASIX) 40 MG tablet Take 40 mg by mouth Daily.   2/12/2023   • MELATONIN PO Take 30 mg by mouth Daily.   2/12/2023   • nitroglycerin (NITRODUR) 0.4 MG/HR patch Place 1 patch on the skin as directed by provider Daily. Left upper ext   2/13/2023   • nitroglycerin (NITROSTAT) 0.4 MG SL tablet Place 0.4 mg under the tongue Every 5 (Five) Minutes As Needed for Chest Pain. Take no more than 3 doses in 15 minutes.   2/12/2023   • potassium chloride ER (K-TAB) 20 MEQ tablet controlled-release ER tablet Take 20 mEq by mouth Daily.   2/12/2023   • rosuvastatin (CRESTOR) 5 MG tablet Take 2.5 mg by mouth Daily.   2/13/2023   • albuterol (PROVENTIL) (2.5 MG/3ML) 0.083% nebulizer solution Take 2.5 mg by nebulization Every 4 (Four) Hours As Needed for Wheezing. 30 vial 1 More than a month   • Blood Glucose Monitoring Suppl (ONE TOUCH ULTRA 2) w/Device kit Used to test once daily- Dx E11.9 Type 2 diabetes 1 each 0    • Eliquis 5 MG tablet tablet Every 12 (Twelve) Hours. Pt reports this medication is discontinued until further notice per Dr. Loja   2/10/2023   • flurbiprofen (OCUFEN) 0.03 % ophthalmic solution Administer 0.3 drops to both eyes As Needed.    More than a month   • glucose blood test strip Used to test once daily. Dx E11.9 Type 2 diabetes 100 each 2    • OneTouch Delica Lancets 33G misc Use once a daily Dx: Type 2 diabetes mellitus without complication, without long-term current use of insulin [E11.9] 100 each 3    • pregabalin (LYRICA) 75 MG capsule       • VITAMIN D PO Take  by mouth.          Current Meds  No current facility-administered medications on file prior to encounter.     Current Outpatient Medications on File Prior to Encounter   Medication Sig Dispense Refill   • allopurinol (ZYLOPRIM) 100 MG tablet      • allopurinol (ZYLOPRIM) 300 MG tablet 500 mg.     • Ascorbic Acid (VITAMIN C PO) Take  by mouth.     • aspirin 325 MG tablet Take 325 mg by mouth Daily.     • carvedilol (COREG) 25 MG tablet Take 25 mg by mouth 2 (Two) Times a Day With Meals.     • colchicine 0.6 MG tablet Daily.     • esomeprazole (nexIUM) 20 MG capsule Take 20 mg by mouth Every Morning Before Breakfast.     • furosemide (LASIX) 40 MG tablet Take 40 mg by mouth Daily.     • MELATONIN PO Take 30 mg by mouth Daily.     • nitroglycerin (NITRODUR) 0.4 MG/HR patch Place 1 patch on the skin as directed by provider Daily. Left upper ext     • nitroglycerin (NITROSTAT) 0.4 MG SL tablet Place 0.4 mg under the tongue Every 5 (Five) Minutes As Needed for Chest Pain. Take no more than 3 doses in 15 minutes.     • potassium chloride ER (K-TAB) 20 MEQ tablet controlled-release ER tablet Take 20 mEq by mouth Daily.     • rosuvastatin (CRESTOR) 5 MG tablet Take 2.5 mg by mouth Daily.     • albuterol (PROVENTIL) (2.5 MG/3ML) 0.083% nebulizer solution Take 2.5 mg by nebulization Every 4 (Four) Hours As Needed for Wheezing. 30 vial 1   • Blood Glucose Monitoring Suppl (ONE TOUCH ULTRA 2) w/Device kit Used to test once daily- Dx E11.9 Type 2 diabetes 1 each 0   • Eliquis 5 MG tablet tablet Every 12 (Twelve) Hours. Pt reports this medication is discontinued until further notice per   Singer     • flurbiprofen (OCUFEN) 0.03 % ophthalmic solution Administer 0.3 drops to both eyes As Needed.     • glucose blood test strip Used to test once daily. Dx E11.9 Type 2 diabetes 100 each 2   • OneTouch Delica Lancets 33G misc Use once a daily Dx: Type 2 diabetes mellitus without complication, without long-term current use of insulin [E11.9] 100 each 3   • pregabalin (LYRICA) 75 MG capsule      • VITAMIN D PO Take  by mouth.     • [DISCONTINUED] cefdinir (OMNICEF) 300 MG capsule Take 1 capsule by mouth 2 (Two) Times a Day. 20 capsule 0       Social History     Socioeconomic History   • Marital status:    Tobacco Use   • Smoking status: Former     Packs/day: 1.00     Years: 30.00     Pack years: 30.00     Types: Cigarettes     Quit date: 1997     Years since quittin.1   • Smokeless tobacco: Never   Substance and Sexual Activity   • Alcohol use: No     Comment: rarely   • Drug use: No   • Sexual activity: Defer       Family Hx: Non-contributory    REVIEW OF SYSTEMS:   ROS was performed and is negative except as outlined in HPI     REVIEW OF SYSTEMS:   CONSTITUTIONAL: No weight loss, fever, chills, weakness or fatigue.   HEENT: Eyes: No visual loss, blurred vision, double vision or yellow sclerae. Ears, Nose, Throat: No hearing loss, sneezing, congestion, runny nose or sore throat.   SKIN: No rash or itching.     RESPIRATORY: No shortness of breath, hemoptysis, cough or sputum.   GASTROINTESTINAL: No anorexia, nausea, vomiting or diarrhea. No abdominal pain, bright red blood per rectum or melena.  NEUROLOGICAL: No headache, dizziness, syncope, paralysis, numbness or tingling in the extremities.  MUSCULOSKELETAL: No muscle, back pain, joint pain or stiffness.   HEMATOLOGIC: No anemia, bleeding or bruising.   LYMPHATICS: No enlarged nodes.  PSYCHIATRIC: No history of depression, anxiety, hallucinations.   ENDOCRINOLOGIC: No reports of sweating, cold or heat intolerance. No polyuria or polydipsia.  "       Objective:     Vitals:    02/13/23 0845 02/13/23 0934   BP: 131/73    BP Location: Right arm    Patient Position: Lying    Pulse: 65    Resp: 18 13   Temp: 98.7 °F (37.1 °C)    TempSrc: Oral    SpO2: 97%    Weight: 104 kg (230 lb)    Height: 172.7 cm (68\")      Body mass index is 34.97 kg/m².  Flowsheet Rows    Flowsheet Row First Filed Value   Admission Height 172.7 cm (68\") Documented at 02/13/2023 0845   Admission Weight 104 kg (230 lb) Documented at 02/13/2023 0845          General Appearance:    Alert, oriented x 3, in no acute distress   Head:    Normocephalic, without obvious abnormality, atraumatic   Ears:    Ears appear intact with no abnormalities noted   Throat:   No oral lesions, dentition good   Neck:   No adenopathy, supple, trachea midline, no thyromegaly, no carotid bruit, no JVD   Lungs:    Breath sounds are equal and  clear to auscultation    Heart:   Normal S1 and S2, RRR, no murmur/gallop or rub   Abdomen:    Normal bowel sounds, obese, soft non-tender, non-distended, no organomegaly, no guarding   Extremities:   Moves all extremities well, no edema, no cyanosis, no redness   Pulses:   Pulses palpable and equal bilaterally. Normal radial pulses   Skin:   No bleeding, bruising or rash   Lymph nodes:   No palpable adenopathy     I personally viewed and interpreted the patient's EKG/Telemetry data    Assessment:  Active Hospital Problems    Diagnosis  POA   • **Unstable angina (HCC) [I20.0]  Unknown     Priority: High      Resolved Hospital Problems   No resolved problems to display.       Plan: New onset of true unstable angina he has been referred by Dr. Bar Loja for cardiac cath.  This seems to be totally appropriate we will get a move forward with that further decisions be based on the findings at the time of cath.  H&P reviewed. The patient was examined and there are no changes to the H&P. I have explained the risks and benefits of the procedure to the patient.  The patient " understands and agrees to proceed

## 2023-02-13 NOTE — DISCHARGE INSTRUCTIONS
[facility]  [address] [place] [address]    Coronary Angiogram (Radial/Ulnar Approach) After Care    Refer to this sheet in the next few weeks. These instructions provide you with information on caring for yourself after your procedure. Your caregiver may also give you more specific instructions. Your treatment has been planned according to current medical practices, but problems sometimes occur. Call your caregiver if you have any problems or questions after your procedure.    Home Care Instructions:  You may shower the day after the procedure. Remove the bandage (dressing) and gently wash the site with plain soap and water. Gently pat the site dry. You may apply a band aid daily for 2 days if desired.    Do not apply powder or lotion to the site.  Do not submerge the affected site in water for 3 to 5 days or until the site is completely healed.   Do not lift, push or pull anything over 5 pounds for 5 days after your procedure or as directed by your physician.  As a reference, a gallon of milk weighs 8 pounds.   Inspect the site at least twice daily. You may notice some bruising at the site and it may be tender for 1 to 2 weeks.     Increase your fluid intake for the next 2 days.    Keep arm elevated for 24 hours. For the remainder of the day, keep your arm in “Pledge of Allegiance” position when up and about.     You may drive 24 hours after the procedure unless otherwise instructed by your caregiver.  Do not operate machinery or power tools for 24 hours.  A responsible adult should be with you for the first 24 hours after you arrive home. Do not make any important legal decisions or sign legal papers for 24 hours.  Do not drink alcohol for 24 hours.    Metformin or any medications containing Metformin should not be taken for 48 hours after your procedure.      Call Your Doctor if:   You have unusual pain at the radial/ulnar (wrist) site.  You have redness, warmth, swelling, or pain at the  radial/ulnar (wrist) site.  You have drainage (other than a small amount of blood on the dressing).  `You have chills or a fever > 101.  Your arm becomes pale or dark, cool, tingly, or numb.  You develop chest pain, shortness of breath, feel faint or pass out.    You have heavy bleeding from the site, hold pressure on the site for 20 minutes.  If the bleeding stops, apply a fresh bandage and call your cardiologist.  However, if you        continue to have bleeding, call 911 and continue to apply pressure to the site.   You have any symptoms of a stroke.  Remember BE FAST  B-balance. Sudden trouble walking or loss of balance.  E-eyes.  Sudden changes in how you see or a sudden onset of a very bad headache.   F-face. Sudden weakness or loss of feeling of the face or facial droop on one side.   A-arms Sudden weakness or numbness in one arm.  One arm drifts down if they are both held out in front of you. This happens suddenly and usually on one side of the body.   S-speech.  Sudden trouble speaking, slurred speech or trouble understanding what are saying.   T-time  Time to call emergency services.  Write down the symptoms and the time they started.

## 2023-02-20 DIAGNOSIS — I10 ESSENTIAL HYPERTENSION: Chronic | ICD-10-CM

## 2023-02-20 DIAGNOSIS — E11.9 TYPE 2 DIABETES MELLITUS WITHOUT COMPLICATION, WITHOUT LONG-TERM CURRENT USE OF INSULIN: ICD-10-CM

## 2023-02-20 LAB
ALBUMIN SERPL-MCNC: 4.3 G/DL (ref 3.5–5.2)
ALBUMIN/GLOB SERPL: 1.7 G/DL
ALP SERPL-CCNC: 119 U/L (ref 39–117)
ALT SERPL-CCNC: 7 U/L (ref 1–41)
AST SERPL-CCNC: 12 U/L (ref 1–40)
BILIRUB SERPL-MCNC: 0.3 MG/DL (ref 0–1.2)
BUN SERPL-MCNC: 22 MG/DL (ref 8–23)
BUN/CREAT SERPL: 17.3 (ref 7–25)
CALCIUM SERPL-MCNC: 9.9 MG/DL (ref 8.6–10.5)
CHLORIDE SERPL-SCNC: 104 MMOL/L (ref 98–107)
CO2 SERPL-SCNC: 27.8 MMOL/L (ref 22–29)
CREAT SERPL-MCNC: 1.27 MG/DL (ref 0.76–1.27)
EGFRCR SERPLBLD CKD-EPI 2021: 59.3 ML/MIN/1.73
GLOBULIN SER CALC-MCNC: 2.5 GM/DL
GLUCOSE SERPL-MCNC: 101 MG/DL (ref 65–99)
HBA1C MFR BLD: 5.7 % (ref 4.8–5.6)
POTASSIUM SERPL-SCNC: 4.5 MMOL/L (ref 3.5–5.2)
PROT SERPL-MCNC: 6.8 G/DL (ref 6–8.5)
SODIUM SERPL-SCNC: 142 MMOL/L (ref 136–145)

## 2023-02-28 ENCOUNTER — OFFICE VISIT (OUTPATIENT)
Dept: INTERNAL MEDICINE | Facility: CLINIC | Age: 73
End: 2023-02-28
Payer: MEDICARE

## 2023-02-28 VITALS
HEIGHT: 68 IN | DIASTOLIC BLOOD PRESSURE: 82 MMHG | BODY MASS INDEX: 34.43 KG/M2 | SYSTOLIC BLOOD PRESSURE: 124 MMHG | WEIGHT: 227.2 LBS

## 2023-02-28 DIAGNOSIS — E11.9 TYPE 2 DIABETES MELLITUS WITHOUT COMPLICATION, WITHOUT LONG-TERM CURRENT USE OF INSULIN: ICD-10-CM

## 2023-02-28 DIAGNOSIS — I10 ESSENTIAL HYPERTENSION: Chronic | ICD-10-CM

## 2023-02-28 DIAGNOSIS — N28.9 RENAL INSUFFICIENCY: ICD-10-CM

## 2023-02-28 DIAGNOSIS — E78.49 OTHER HYPERLIPIDEMIA: Primary | Chronic | ICD-10-CM

## 2023-02-28 PROCEDURE — 99214 OFFICE O/P EST MOD 30 MIN: CPT | Performed by: INTERNAL MEDICINE

## 2023-02-28 RX ORDER — RANOLAZINE 500 MG/1
500 TABLET, EXTENDED RELEASE ORAL 2 TIMES DAILY
COMMUNITY

## 2023-02-28 NOTE — PROGRESS NOTES
Subjective        Chief Complaint   Patient presents with   • Hypertension   • Diabetes           Silvino Hidalgo Jr. is a 72 y.o. male who presents for    Patient Active Problem List   Diagnosis   • ERICK (obstructive sleep apnea)   • Edema   • Other hyperlipidemia   • Ischemic cardiomyopathy   • PFO (patent foramen ovale)   • RBBB (right bundle branch block)   • Rotator cuff arthropathy   • S/P CABG (coronary artery bypass graft)   • Stroke ([facility])   • Trifascicular bundle branch block   • CAD (coronary artery disease)   • Essential hypertension   • Renal insufficiency   • Type 2 diabetes mellitus without complication, without long-term current use of insulin ([facility])   • Unstable angina ([facility])       History of Present Illness     His BP has been 116-120/?? He had a heart cath with Dr. Torers. 3 of the 4 bypasses were open. Dr. Torres tried to open the occluded vein graft but was unable. He was started on Ranexa. He was taken off Eliquis. His chest pain has improved. He is tolerating low dose Crestor. He saw Dr. Castrejon and had cysto and he had a tiny removed.  Allergies   Allergen Reactions   • Levofloxacin Headache and Nausea And Vomiting     Other reaction(s): Headache, Nausea     • Prednisone Swelling     Other reaction(s): Swelling         Current Outpatient Medications on File Prior to Visit   Medication Sig Dispense Refill   • albuterol (PROVENTIL) (2.5 MG/3ML) 0.083% nebulizer solution Take 2.5 mg by nebulization Every 4 (Four) Hours As Needed for Wheezing. 30 vial 1   • allopurinol (ZYLOPRIM) 100 MG tablet      • allopurinol (ZYLOPRIM) 300 MG tablet 500 mg.     • Ascorbic Acid (VITAMIN C PO) Take  by mouth.     • aspirin 325 MG tablet Take 325 mg by mouth Daily.     • Blood Glucose Monitoring Suppl (ONE TOUCH ULTRA 2) w/Device kit Used to test once daily- Dx E11.9 Type 2 diabetes 1 each 0   • carvedilol (COREG) 25 MG tablet Take 25 mg by mouth 2 (Two) Times a Day With Meals.     • colchicine 0.6 MG tablet Daily.     •  esomeprazole (nexIUM) 20 MG capsule Take 20 mg by mouth Every Morning Before Breakfast.     • flurbiprofen (OCUFEN) 0.03 % ophthalmic solution Administer 0.3 drops to both eyes As Needed.     • furosemide (LASIX) 40 MG tablet Take 40 mg by mouth Daily.     • glucose blood test strip Used to test once daily. Dx E11.9 Type 2 diabetes 100 each 2   • MELATONIN PO Take 30 mg by mouth Daily.     • nitroglycerin (NITRODUR) 0.4 MG/HR patch Place 1 patch on the skin as directed by provider Daily. Left upper ext     • nitroglycerin (NITROSTAT) 0.4 MG SL tablet Place 0.4 mg under the tongue Every 5 (Five) Minutes As Needed for Chest Pain. Take no more than 3 doses in 15 minutes.     • OneTouch Delica Lancets 33G misc Use once a daily Dx: Type 2 diabetes mellitus without complication, without long-term current use of insulin [E11.9] 100 each 3   • potassium chloride ER (K-TAB) 20 MEQ tablet controlled-release ER tablet Take 20 mEq by mouth Daily.     • ranolazine (RANEXA) 500 MG 12 hr tablet Take 1 tablet by mouth 2 (Two) Times a Day.     • rosuvastatin (CRESTOR) 5 MG tablet Take 2.5 mg by mouth Daily.     • VITAMIN D PO Take  by mouth.     • [DISCONTINUED] Eliquis 5 MG tablet tablet Every 12 (Twelve) Hours. Pt reports this medication is discontinued until further notice per Dr. Loja     • [DISCONTINUED] pregabalin (LYRICA) 75 MG capsule        No current facility-administered medications on file prior to visit.       Past Medical History:   Diagnosis Date   • Atrial fibrillation (HCC)    • Bladder cancer (HCC) 2022   • Constipation    • Diastasis recti    • Gout    • Myocardial infarction (HCC)    • Pneumonia    • Sleep apnea     Bi-Pap machine   • TIA (transient ischemic attack)    • Tubular adenoma of colon        Past Surgical History:   Procedure Laterality Date   • CARDIAC CATHETERIZATION     • CARDIAC CATHETERIZATION N/A 2/13/2023    Procedure: Left Heart Cath;  Surgeon: Alexsander Torres MD;  Location: [facility]  INVASIVE LOCATION;  Service: Cardiovascular;  Laterality: N/A;   • CARDIAC CATHETERIZATION N/A 2023    Procedure: Coronary angiography;  Surgeon: Alexsander Torres MD;  Location:  [facility] CATH INVASIVE LOCATION;  Service: Cardiovascular;  Laterality: N/A;   • CARDIAC CATHETERIZATION N/A 2023    Procedure: Left ventriculography;  Surgeon: Alexsander Torres MD;  Location:  [facility] CATH INVASIVE LOCATION;  Service: Cardiovascular;  Laterality: N/A;   • CARDIAC CATHETERIZATION  2023    Procedure: Saphenous Vein Graft;  Surgeon: Alexsander Torres MD;  Location:  [facility] CATH INVASIVE LOCATION;  Service: Cardiovascular;;   • CARDIAC CATHETERIZATION Left 2023    Procedure: Native mammary injection;  Surgeon: Alexsander Torers MD;  Location:  [facility] CATH INVASIVE LOCATION;  Service: Cardiovascular;  Laterality: Left;   • CARDIAC DEFIBRILLATOR PLACEMENT  2019   • CATARACT EXTRACTION     • COLONOSCOPY  2018    repeat 5 years Dr. Daniel   • CORONARY ARTERY BYPASS GRAFT     • HEMORRHOIDECTOMY     • HERNIA REPAIR     • PACEMAKER IMPLANTATION     • REPLACEMENT TOTAL KNEE Bilateral    • REPLACEMENT TOTAL KNEE Left    • ROTATOR CUFF REPAIR Right    • SHOULDER SURGERY Left     reversal       Family History   Problem Relation Age of Onset   • Alcohol abuse Mother    • Arthritis Mother    • Colon cancer Mother    • Rectal cancer Mother    • Alcohol abuse Father    • Arthritis Father    • Lung cancer Father        Social History     Socioeconomic History   • Marital status:    Tobacco Use   • Smoking status: Former     Packs/day: 1.00     Years: 30.00     Pack years: 30.00     Types: Cigarettes     Quit date: 1997     Years since quittin.1   • Smokeless tobacco: Never   Substance and Sexual Activity   • Alcohol use: No     Comment: rarely   • Drug use: No   • Sexual activity: Defer           The following portions of the patient's history were reviewed and updated as appropriate: problem list,  "allergies, current medications, past medical history, past family history, past social history and past surgical history.    Review of Systems    Immunization History   Administered Date(s) Administered   • COVID-19 (PFIZER) BIVALENT BOOSTER 12+YRS 12/11/2022   • COVID-19 (PFIZER) PURPLE CAP 02/23/2021, 03/16/2021, 10/02/2021   • Covid-19 (Pfizer) Gray Cap 04/14/2022   • FLUAD TRI 65YR+ 10/08/2019   • Fluad Quad 65+ 09/22/2020   • Fluzone High Dose =>65 Years ([employer] ONLY) 10/08/2018   • Fluzone High-Dose 65+yrs 11/18/2021   • Hepatitis A 10/01/2018, 05/01/2019   • Pneumococcal Conjugate 13-Valent (PCV13) 10/03/2016   • Pneumococcal Polysaccharide (PPSV23) 10/28/2014   • Tdap 10/14/2015       Objective   Vitals:    02/28/23 1244   BP: 124/82   Weight: 103 kg (227 lb 3.2 oz)   Height: 172.7 cm (68\")     Body mass index is 34.55 kg/m².  Physical Exam  Vitals reviewed.   Constitutional:       Appearance: He is well-developed.   HENT:      Head: Normocephalic and atraumatic.   Cardiovascular:      Rate and Rhythm: Normal rate and regular rhythm.      Heart sounds: Normal heart sounds, S1 normal and S2 normal.   Pulmonary:      Effort: Pulmonary effort is normal.      Breath sounds: Normal breath sounds.   Skin:     General: Skin is warm.   Neurological:      Mental Status: He is alert.   Psychiatric:         Behavior: Behavior normal.         Procedures    Assessment & Plan   Diagnoses and all orders for this visit:    1. Other hyperlipidemia (Primary)  -     Lipid Panel With / Chol / HDL Ratio; Future    2. Essential hypertension  Comments:  BP is good    3. Type 2 diabetes mellitus without complication, without long-term current use of insulin (HCC)  -     Hemoglobin A1c; Future  -     Microalbumin / Creatinine Urine Ratio - Urine, Clean Catch; Future    4. Renal insufficiency  -     Comprehensive Metabolic Panel; Future               He sees Dr. Loja on Thursday; he will ask about why not on  Eliquis. He is able " tolerate low dose Crestor. Reviewed cmp, a1c and heart cath. Kidneys and a1c are good. recc Shingrix.  Return in about 4 months (around 6/28/2023) for Medicare Wellness, Lab Before FUP.

## 2023-05-15 ENCOUNTER — OFFICE VISIT (OUTPATIENT)
Dept: INTERNAL MEDICINE | Facility: CLINIC | Age: 73
End: 2023-05-15
Payer: MEDICARE

## 2023-05-15 VITALS
SYSTOLIC BLOOD PRESSURE: 116 MMHG | WEIGHT: 226 LBS | DIASTOLIC BLOOD PRESSURE: 82 MMHG | TEMPERATURE: 97.9 F | HEIGHT: 68 IN | BODY MASS INDEX: 34.25 KG/M2

## 2023-05-15 DIAGNOSIS — J01.80 OTHER ACUTE SINUSITIS, RECURRENCE NOT SPECIFIED: Primary | ICD-10-CM

## 2023-05-15 DIAGNOSIS — D36.9 TUBULAR ADENOMA: ICD-10-CM

## 2023-05-15 PROCEDURE — 99213 OFFICE O/P EST LOW 20 MIN: CPT | Performed by: INTERNAL MEDICINE

## 2023-05-15 PROCEDURE — 3074F SYST BP LT 130 MM HG: CPT | Performed by: INTERNAL MEDICINE

## 2023-05-15 PROCEDURE — 3044F HG A1C LEVEL LT 7.0%: CPT | Performed by: INTERNAL MEDICINE

## 2023-05-15 PROCEDURE — 3079F DIAST BP 80-89 MM HG: CPT | Performed by: INTERNAL MEDICINE

## 2023-05-15 PROCEDURE — 1159F MED LIST DOCD IN RCRD: CPT | Performed by: INTERNAL MEDICINE

## 2023-05-15 PROCEDURE — 1160F RVW MEDS BY RX/DR IN RCRD: CPT | Performed by: INTERNAL MEDICINE

## 2023-05-15 RX ORDER — AMOXICILLIN AND CLAVULANATE POTASSIUM 875; 125 MG/1; MG/1
1 TABLET, FILM COATED ORAL 2 TIMES DAILY
Qty: 20 TABLET | Refills: 0 | Status: SHIPPED | OUTPATIENT
Start: 2023-05-15

## 2023-05-15 NOTE — PROGRESS NOTES
Subjective        Chief Complaint   Patient presents with   • Headache   • Nasal Congestion           Silvino Hidalgo Jr. is a 73 y.o. male who presents for    Patient Active Problem List   Diagnosis   • ERICK (obstructive sleep apnea)   • Edema   • Other hyperlipidemia   • Ischemic cardiomyopathy   • PFO (patent foramen ovale)   • RBBB (right bundle branch block)   • Rotator cuff arthropathy   • S/P CABG (coronary artery bypass graft)   • Stroke   • Trifascicular bundle branch block   • CAD (coronary artery disease)   • Essential hypertension   • Renal insufficiency   • Type 2 diabetes mellitus without complication, without long-term current use of insulin   • Unstable angina       History of Present Illness     He has green nasal DC for a month that has gotten worse with a HA and teeth pain. He denies fever, chills or productive cough.   Allergies   Allergen Reactions   • Levofloxacin Headache and Nausea And Vomiting     Other reaction(s): Headache, Nausea     • Prednisone Swelling     Other reaction(s): Swelling         Current Outpatient Medications on File Prior to Visit   Medication Sig Dispense Refill   • albuterol (PROVENTIL) (2.5 MG/3ML) 0.083% nebulizer solution Take 2.5 mg by nebulization Every 4 (Four) Hours As Needed for Wheezing. 30 vial 1   • allopurinol (ZYLOPRIM) 100 MG tablet      • allopurinol (ZYLOPRIM) 300 MG tablet 500 mg.     • Ascorbic Acid (VITAMIN C PO) Take  by mouth.     • aspirin 325 MG tablet Take 1 tablet by mouth Daily.     • carvedilol (COREG) 25 MG tablet Take 1 tablet by mouth 2 (Two) Times a Day With Meals.     • colchicine 0.6 MG tablet Daily.     • esomeprazole (nexIUM) 20 MG capsule Take 1 capsule by mouth Every Morning Before Breakfast.     • flurbiprofen (OCUFEN) 0.03 % ophthalmic solution Administer 0.3 drops to both eyes As Needed.     • furosemide (LASIX) 40 MG tablet Take 1 tablet by mouth Daily.     • MELATONIN PO Take 30 mg by mouth Daily.     • nitroglycerin (NITRODUR)  0.4 MG/HR patch Place 1 patch on the skin as directed by provider Daily. Left upper ext     • nitroglycerin (NITROSTAT) 0.4 MG SL tablet Place 1 tablet under the tongue Every 5 (Five) Minutes As Needed for Chest Pain. Take no more than 3 doses in 15 minutes.     • potassium chloride ER (K-TAB) 20 MEQ tablet controlled-release ER tablet Take 1 tablet by mouth Daily.     • ranolazine (RANEXA) 500 MG 12 hr tablet Take 1 tablet by mouth 2 (Two) Times a Day.     • rosuvastatin (CRESTOR) 5 MG tablet Take 2.5 mg by mouth Daily.     • VITAMIN D PO Take  by mouth.     • [DISCONTINUED] Blood Glucose Monitoring Suppl (ONE TOUCH ULTRA 2) w/Device kit Used to test once daily- Dx E11.9 Type 2 diabetes (Patient not taking: Reported on 5/15/2023) 1 each 0   • [DISCONTINUED] glucose blood test strip Used to test once daily. Dx E11.9 Type 2 diabetes (Patient not taking: Reported on 5/15/2023) 100 each 2   • [DISCONTINUED] OneTouch Delica Lancets 33G misc Use once a daily Dx: Type 2 diabetes mellitus without complication, without long-term current use of insulin [E11.9] (Patient not taking: Reported on 5/15/2023) 100 each 3     No current facility-administered medications on file prior to visit.       Past Medical History:   Diagnosis Date   • Atrial fibrillation    • Bladder cancer 2022   • Constipation    • Diastasis recti    • Gout    • Myocardial infarction    • Pneumonia    • Sleep apnea     Bi-Pap machine   • TIA (transient ischemic attack)    • Tubular adenoma of colon        Past Surgical History:   Procedure Laterality Date   • CARDIAC CATHETERIZATION     • CARDIAC CATHETERIZATION N/A 2/13/2023    Procedure: Left Heart Cath;  Surgeon: Alexsander Torres MD;  Location: [facility] CATH INVASIVE LOCATION;  Service: Cardiovascular;  Laterality: N/A;   • CARDIAC CATHETERIZATION N/A 2/13/2023    Procedure: Coronary angiography;  Surgeon: Alexsander Torres MD;  Location: [facility] CATH INVASIVE LOCATION;  Service: Cardiovascular;  Laterality:  N/A;   • CARDIAC CATHETERIZATION N/A 2023    Procedure: Left ventriculography;  Surgeon: Alexsander Torres MD;  Location:  [facility] CATH INVASIVE LOCATION;  Service: Cardiovascular;  Laterality: N/A;   • CARDIAC CATHETERIZATION  2023    Procedure: Saphenous Vein Graft;  Surgeon: Alexsander Torres MD;  Location:  [facility] CATH INVASIVE LOCATION;  Service: Cardiovascular;;   • CARDIAC CATHETERIZATION Left 2023    Procedure: Native mammary injection;  Surgeon: Alexsander Torres MD;  Location:  [facility] CATH INVASIVE LOCATION;  Service: Cardiovascular;  Laterality: Left;   • CARDIAC DEFIBRILLATOR PLACEMENT  2019   • CATARACT EXTRACTION     • COLONOSCOPY  2018    repeat 5 years Dr. Daniel   • CORONARY ARTERY BYPASS GRAFT     • HEMORRHOIDECTOMY     • HERNIA REPAIR     • PACEMAKER IMPLANTATION     • REPLACEMENT TOTAL KNEE Bilateral    • REPLACEMENT TOTAL KNEE Left    • ROTATOR CUFF REPAIR Right    • SHOULDER SURGERY Left     reversal       Family History   Problem Relation Age of Onset   • Alcohol abuse Mother    • Arthritis Mother    • Colon cancer Mother    • Rectal cancer Mother    • Alcohol abuse Father    • Arthritis Father    • Lung cancer Father        Social History     Socioeconomic History   • Marital status:    Tobacco Use   • Smoking status: Former     Packs/day: 1.00     Years: 30.00     Pack years: 30.00     Types: Cigarettes     Quit date: 1997     Years since quittin.3   • Smokeless tobacco: Never   Substance and Sexual Activity   • Alcohol use: No     Comment: rarely   • Drug use: No   • Sexual activity: Defer           The following portions of the patient's history were reviewed and updated as appropriate: problem list, allergies, current medications, past medical history, past family history, past social history and past surgical history.    Review of Systems    Immunization History   Administered Date(s) Administered   • COVID-19 (PFIZER) BIVALENT 12+YRS 2022  "  • COVID-19 (PFIZER) Purple Cap Monovalent 02/23/2021, 03/16/2021   • FLUAD TRI 65YR+ 10/08/2019   • Fluad Quad 65+ 09/22/2020   • Fluzone High Dose =>65 Years ([employer] ONLY) 10/08/2018   • Fluzone High-Dose 65+yrs 11/18/2021   • Hepatitis A 10/01/2018, 05/01/2019   • Pneumococcal Conjugate 13-Valent (PCV13) 10/03/2016   • Pneumococcal Polysaccharide (PPSV23) 10/28/2014   • Tdap 10/14/2015       Objective   Vitals:    05/15/23 1421   BP: 116/82   Temp: 97.9 °F (36.6 °C)   Weight: 103 kg (226 lb)   Height: 172.7 cm (67.99\")     Body mass index is 34.37 kg/m².  Physical Exam  Vitals reviewed.   Constitutional:       Appearance: He is well-developed.   HENT:      Head: Normocephalic and atraumatic.      Mouth/Throat:      Mouth: Mucous membranes are moist.      Pharynx: Oropharynx is clear.      Comments: Frontal and maxillary sinuses non tender  Cardiovascular:      Rate and Rhythm: Normal rate and regular rhythm.      Heart sounds: Normal heart sounds, S1 normal and S2 normal.   Pulmonary:      Effort: Pulmonary effort is normal.      Breath sounds: Normal breath sounds.   Skin:     General: Skin is warm.   Neurological:      Mental Status: He is alert.   Psychiatric:         Behavior: Behavior normal.         Procedures    Assessment & Plan   Diagnoses and all orders for this visit:    1. Other acute sinusitis, recurrence not specified (Primary)  -     amoxicillin-clavulanate (Augmentin) 875-125 MG per tablet; Take 1 tablet by mouth 2 (Two) Times a Day.  Dispense: 20 tablet; Refill: 0    2. Tubular adenoma  -     Ambulatory Referral to Gastroenterology      Treat sinusitis; discussed not waiting more than 7-10 days if has colored DC.    Due for cscope.           No follow-ups on file.  "

## 2023-08-02 ENCOUNTER — HOSPITAL ENCOUNTER (OUTPATIENT)
Dept: CT IMAGING | Facility: HOSPITAL | Age: 73
Discharge: HOME | End: 2023-08-02
Admitting: INTERNAL MEDICINE
Payer: MEDICARE

## 2023-08-02 PROCEDURE — 70496 CT ANGIOGRAPHY HEAD: CPT

## 2023-08-02 PROCEDURE — 25510000001 IOPAMIDOL 61 % SOLUTION: Performed by: INTERNAL MEDICINE

## 2023-08-02 PROCEDURE — 70498 CT ANGIOGRAPHY NECK: CPT

## 2023-08-02 RX ADMIN — IOPAMIDOL 100 ML: 612 INJECTION, SOLUTION INTRAVENOUS at 11:38

## 2023-08-04 DIAGNOSIS — I65.09 VERTEBRAL ARTERY STENOSIS, UNSPECIFIED LATERALITY: Primary | ICD-10-CM

## 2023-08-08 NOTE — PROGRESS NOTES
Subjective   Patient ID: Silvino Hidalgo Jr. is a 73 y.o. male is being seen for consultation today at the request of Juan Alberto Rutherford MD for vertebral artery stenosis. Patient had CTA head/neck done at [facility] on 08/02/2023. Patient reports he has dizziness, and every time he stands up, he loses his hearing.     History of Present Illness  73-year-old male with an extensive cardiac history as well as hypertension and hyperlipidemia plus diabetes mellitus type 2 who has been having several months of syncope, dizziness and transient hearing loss with standing from sitting or lying.  The patient says he only experiences this when getting up and not once up or when sitting or laying.  He describes it as only lasting a few minutes until it dissipates after getting up.  He reports no double vision.  He underwent CT angiography of the head and neck vasculature and presents today to review the findings.  He is currently on Eliquis as well as Crestor and blood pressure medications.  He is a former smoker, but quit in 1997 and has been tobacco free since.    The following portions of the patient's history were reviewed and updated as appropriate: He  has a past medical history of Atrial fibrillation, Bladder cancer (2022), Constipation, Diastasis recti, Gout, Myocardial infarction, Pneumonia, Sleep apnea, TIA (transient ischemic attack), and Tubular adenoma of colon.  He does not have any pertinent problems on file.  He  has a past surgical history that includes Replacement total knee (Bilateral); Coronary artery bypass graft; Hernia repair; Colonoscopy (03/22/2018); Cardiac catheterization; Cataract extraction; Hemorrhoid surgery; Rotator cuff repair (Right); Shoulder surgery (Left); Cardiac defibrillator placement (07/22/2019); Pacemaker Implantation; Replacement total knee (Left, 2022); Cardiac catheterization (N/A, 02/13/2023); Cardiac catheterization (N/A, 02/13/2023); Cardiac catheterization (N/A, 02/13/2023); Cardiac  catheterization (02/13/2023); Cardiac catheterization (Left, 02/13/2023); and cystoscopy bladder biopsy (02/2023).  His family history includes Alcohol abuse in his father and mother; Arthritis in his father and mother; Colon cancer in his mother; Lung cancer in his father; Rectal cancer in his mother.  He  reports that he quit smoking about 26 years ago. His smoking use included cigarettes. He has a 30.00 pack-year smoking history. He has never used smokeless tobacco. He reports that he does not drink alcohol and does not use drugs.  Current Outpatient Medications   Medication Sig Dispense Refill    albuterol (PROVENTIL) (2.5 MG/3ML) 0.083% nebulizer solution Take 2.5 mg by nebulization Every 4 (Four) Hours As Needed for Wheezing. 30 vial 1    allopurinol (ZYLOPRIM) 100 MG tablet       allopurinol (ZYLOPRIM) 300 MG tablet 500 mg.      apixaban (ELIQUIS) 5 MG tablet tablet Take 1 tablet by mouth 2 (Two) Times a Day.      Ascorbic Acid (VITAMIN C PO) Take  by mouth.      carvedilol (COREG) 25 MG tablet Take 1 tablet by mouth 2 (Two) Times a Day With Meals.      colchicine 0.6 MG tablet Daily.      esomeprazole (nexIUM) 20 MG capsule Take 1 capsule by mouth Every Morning Before Breakfast.      flurbiprofen (OCUFEN) 0.03 % ophthalmic solution Administer 0.3 drops to both eyes As Needed.      furosemide (LASIX) 40 MG tablet Take 1 tablet by mouth Daily.      nitroglycerin (NITRODUR) 0.4 MG/HR patch Place 1 patch on the skin as directed by provider Daily. Left upper ext      nitroglycerin (NITROSTAT) 0.4 MG SL tablet Place 1 tablet under the tongue Every 5 (Five) Minutes As Needed for Chest Pain. Take no more than 3 doses in 15 minutes.      potassium chloride ER (K-TAB) 20 MEQ tablet controlled-release ER tablet Take 1 tablet by mouth Daily.      ranolazine (RANEXA) 500 MG 12 hr tablet Take 1 tablet by mouth 2 (Two) Times a Day.      rosuvastatin (CRESTOR) 5 MG tablet Take 0.5 tablets by mouth Daily.      VITAMIN D PO  Take  by mouth.       No current facility-administered medications for this visit.     Current Outpatient Medications on File Prior to Visit   Medication Sig    albuterol (PROVENTIL) (2.5 MG/3ML) 0.083% nebulizer solution Take 2.5 mg by nebulization Every 4 (Four) Hours As Needed for Wheezing.    allopurinol (ZYLOPRIM) 100 MG tablet     allopurinol (ZYLOPRIM) 300 MG tablet 500 mg.    apixaban (ELIQUIS) 5 MG tablet tablet Take 1 tablet by mouth 2 (Two) Times a Day.    Ascorbic Acid (VITAMIN C PO) Take  by mouth.    carvedilol (COREG) 25 MG tablet Take 1 tablet by mouth 2 (Two) Times a Day With Meals.    colchicine 0.6 MG tablet Daily.    esomeprazole (nexIUM) 20 MG capsule Take 1 capsule by mouth Every Morning Before Breakfast.    flurbiprofen (OCUFEN) 0.03 % ophthalmic solution Administer 0.3 drops to both eyes As Needed.    furosemide (LASIX) 40 MG tablet Take 1 tablet by mouth Daily.    nitroglycerin (NITRODUR) 0.4 MG/HR patch Place 1 patch on the skin as directed by provider Daily. Left upper ext    nitroglycerin (NITROSTAT) 0.4 MG SL tablet Place 1 tablet under the tongue Every 5 (Five) Minutes As Needed for Chest Pain. Take no more than 3 doses in 15 minutes.    potassium chloride ER (K-TAB) 20 MEQ tablet controlled-release ER tablet Take 1 tablet by mouth Daily.    ranolazine (RANEXA) 500 MG 12 hr tablet Take 1 tablet by mouth 2 (Two) Times a Day.    rosuvastatin (CRESTOR) 5 MG tablet Take 0.5 tablets by mouth Daily.    VITAMIN D PO Take  by mouth.     No current facility-administered medications on file prior to visit.     He is allergic to levofloxacin and prednisone..    Review of Systems   Constitutional:  Negative for chills, fatigue and fever.   Eyes:  Positive for visual disturbance (patient reports that every time he stands up, he loses his hearing).   Respiratory:  Negative for cough and shortness of breath.    Cardiovascular:  Negative for chest pain.   Neurological:  Positive for dizziness and  "light-headedness. Negative for numbness.   Psychiatric/Behavioral:  Negative for confusion and decreased concentration.      Objective     Vitals:    08/10/23 1116   BP: 124/72   Pulse: 72   Resp: 18   Temp: 97.6 øF (36.4 øC)   SpO2: 96%   Weight: 103 kg (227 lb 9.6 oz)   Height: 172.7 cm (67.99\")     Body mass index is 34.61 kg/mý.    Tobacco Use: Medium Risk    Smoking Tobacco Use: Former    Smokeless Tobacco Use: Never    Passive Exposure: Not on file          Physical Exam  Vitals and nursing note reviewed.   Constitutional:       General: He is not in acute distress.     Appearance: He is obese.   HENT:      Head: Normocephalic.      Nose: Nose normal.      Mouth/Throat:      Mouth: Mucous membranes are moist.   Eyes:      Extraocular Movements: Extraocular movements intact.      Conjunctiva/sclera: Conjunctivae normal.   Cardiovascular:      Rate and Rhythm: Normal rate.   Pulmonary:      Effort: Pulmonary effort is normal.   Musculoskeletal:         General: Normal range of motion.      Cervical back: Normal range of motion.   Skin:     General: Skin is warm and dry.   Neurological:      General: No focal deficit present.      Mental Status: He is alert and oriented to person, place, and time.      Cranial Nerves: No cranial nerve deficit.      Sensory: No sensory deficit.      Motor: No weakness.      Coordination: Coordination normal.      Gait: Gait normal.   Psychiatric:         Mood and Affect: Mood normal.         Behavior: Behavior normal.         Thought Content: Thought content normal.         Judgment: Judgment normal.   Neurologic Exam     Mental Status   Oriented to person, place, and time.         Assessment & Plan   Independent Review of Radiographic Studies:      I personally reviewed the images from the following studies.    The CT arteriogram of the head neck vasculature dated 8/2/2023 was reviewed with the patient and compared to the prior exam of 3/23/2020.  This shows an area of " encephalomalacia in the right occipital lobe and the right PCA territory.  The right vertebral artery is dominant and shows some plaque at its origin with narrowing present.  The left vertebral artery is smaller, but patent at its origin and and shows only mild hypoplasia in its post PICA segment with good runoff to the basilar artery.  The basilar artery terminates into posterior cerebral arteries bilaterally and no suggestion of vertebrobasilar insufficiency is identified.    Medical Decision Makin-year-old male with symptoms that sound suspicious for orthostatic hypotension rather than vertebrobasilar insufficiency.  Patient's symptoms only occur when he stands up and soon dissipate after he has been up.  There is atherosclerotic plaque seen at the vertebral artery origins but no definite high-grade stenosis appreciated.  A tilt table test is recommended with the patient's cardiologist.  Should he have persistent symptoms that are felt not related to blood pressure fluctuations, an arteriogram can be obtained to better define the vertebral artery origins.  Patient otherwise is to continue his medical management of his cerebrovascular risk factors including blood pressure control with antihypertensives and Crestor for his hyperlipidemia.  Patient is on Eliquis with his extensive cardiac history and possible atrial fibrillation.    Diagnoses and all orders for this visit:    1. Essential hypertension (Primary)    2. Dizziness of unknown etiology    3. Other hyperlipidemia    4. Ischemic cardiomyopathy    5. S/P CABG (coronary artery bypass graft)    6. Coronary artery disease involving native coronary artery of native heart without angina pectoris    7. PFO (patent foramen ovale)    8. Type 2 diabetes mellitus without complication, without long-term current use of insulin      Return in about 6 months (around 2/10/2024) for Next scheduled follow up, Recheck the patient's symptoms and tilt table  results..

## 2023-08-10 ENCOUNTER — OFFICE VISIT (OUTPATIENT)
Dept: NEUROSURGERY | Facility: CLINIC | Age: 73
End: 2023-08-10
Payer: MEDICARE

## 2023-08-10 VITALS
WEIGHT: 227.6 LBS | SYSTOLIC BLOOD PRESSURE: 124 MMHG | OXYGEN SATURATION: 96 % | DIASTOLIC BLOOD PRESSURE: 72 MMHG | HEIGHT: 68 IN | TEMPERATURE: 97.6 F | RESPIRATION RATE: 18 BRPM | HEART RATE: 72 BPM | BODY MASS INDEX: 34.49 KG/M2

## 2023-08-10 DIAGNOSIS — I25.10 CORONARY ARTERY DISEASE INVOLVING NATIVE CORONARY ARTERY OF NATIVE HEART WITHOUT ANGINA PECTORIS: ICD-10-CM

## 2023-08-10 DIAGNOSIS — Z95.1 S/P CABG (CORONARY ARTERY BYPASS GRAFT): ICD-10-CM

## 2023-08-10 DIAGNOSIS — Q21.12 PFO (PATENT FORAMEN OVALE): ICD-10-CM

## 2023-08-10 DIAGNOSIS — R42 DIZZINESS OF UNKNOWN ETIOLOGY: ICD-10-CM

## 2023-08-10 DIAGNOSIS — I10 ESSENTIAL HYPERTENSION: Primary | Chronic | ICD-10-CM

## 2023-08-10 DIAGNOSIS — I25.5 ISCHEMIC CARDIOMYOPATHY: ICD-10-CM

## 2023-08-10 DIAGNOSIS — E78.49 OTHER HYPERLIPIDEMIA: Chronic | ICD-10-CM

## 2023-08-10 DIAGNOSIS — E11.9 TYPE 2 DIABETES MELLITUS WITHOUT COMPLICATION, WITHOUT LONG-TERM CURRENT USE OF INSULIN: ICD-10-CM

## 2023-08-14 DIAGNOSIS — E83.52 HYPERCALCEMIA: Primary | ICD-10-CM

## 2023-08-14 DIAGNOSIS — E11.9 TYPE 2 DIABETES MELLITUS WITHOUT COMPLICATION, WITHOUT LONG-TERM CURRENT USE OF INSULIN: ICD-10-CM

## (undated) DEVICE — HI-TORQUE WHISPER ES GUIDE WIRE .014 STRAIGHTTIP 3.0 CM X 190 CM: Brand: HI-TORQUE WHISPER

## (undated) DEVICE — GUIDE CATHETER: Brand: MACH1™

## (undated) DEVICE — 6F .070 JR 4 100CM: Brand: CORDIS

## (undated) DEVICE — GLIDESHEATH BASIC HYDROPHILIC COATED INTRODUCER SHEATH: Brand: GLIDESHEATH

## (undated) DEVICE — TR BAND RADIAL ARTERY COMPRESSION DEVICE: Brand: TR BAND

## (undated) DEVICE — DEV INDEFLATOR P/N 580289

## (undated) DEVICE — PK CATH CARD 40

## (undated) DEVICE — CATH DIAG IMPULSE FR4 5F 100CM

## (undated) DEVICE — CATH DIAG IMPULSE IMT 5F 100CM

## (undated) DEVICE — CATH DIAG IMPULSE FL4 5F 100CM

## (undated) DEVICE — KT MANIFLD CARDIAC

## (undated) DEVICE — GW EMR FIX EXCHG J STD .035 3MM 260CM

## (undated) DEVICE — CATH4F INF PIG 145Â° MOD 110CM: Brand: INFINITI